# Patient Record
Sex: FEMALE | Race: AMERICAN INDIAN OR ALASKA NATIVE | Employment: UNEMPLOYED | URBAN - METROPOLITAN AREA
[De-identification: names, ages, dates, MRNs, and addresses within clinical notes are randomized per-mention and may not be internally consistent; named-entity substitution may affect disease eponyms.]

---

## 2017-12-18 ENCOUNTER — HISTORICAL (OUTPATIENT)
Dept: LAB | Facility: HOSPITAL | Age: 32
End: 2017-12-18

## 2017-12-18 LAB
ALBUMIN SERPL-MCNC: 3.7 GM/DL (ref 3.4–5)
ALBUMIN/GLOB SERPL: 0.8 RATIO (ref 1.1–2)
ALP SERPL-CCNC: 80 UNIT/L (ref 46–116)
ALT SERPL-CCNC: 17 UNIT/L (ref 12–78)
AST SERPL-CCNC: 12 UNIT/L (ref 15–37)
BILIRUB SERPL-MCNC: 0.4 MG/DL (ref 0.2–1)
BILIRUBIN DIRECT+TOT PNL SERPL-MCNC: 0.11 MG/DL (ref 0–0.2)
BILIRUBIN DIRECT+TOT PNL SERPL-MCNC: 0.24 MG/DL (ref 0–0.8)
BUN SERPL-MCNC: 10 MG/DL (ref 7–18)
CALCIUM SERPL-MCNC: 9.3 MG/DL (ref 8.5–10.1)
CHLORIDE SERPL-SCNC: 106 MMOL/L (ref 98–107)
CO2 SERPL-SCNC: 26.7 MMOL/L (ref 21–32)
CREAT SERPL-MCNC: 1.24 MG/DL (ref 0.6–1.3)
GLOBULIN SER-MCNC: 4.4 GM/DL (ref 2.4–3.5)
GLUCOSE SERPL-MCNC: 90 MG/DL (ref 74–106)
POTASSIUM SERPL-SCNC: 3.8 MMOL/L (ref 3.5–5.1)
PROT SERPL-MCNC: 8.1 GM/DL (ref 6.4–8.2)
SODIUM SERPL-SCNC: 142 MMOL/L (ref 136–145)

## 2018-07-23 ENCOUNTER — HISTORICAL (OUTPATIENT)
Dept: RADIOLOGY | Facility: HOSPITAL | Age: 33
End: 2018-07-23

## 2019-03-20 ENCOUNTER — HISTORICAL (OUTPATIENT)
Dept: LAB | Facility: HOSPITAL | Age: 34
End: 2019-03-20

## 2019-03-23 LAB
BUN SERPL-MCNC: 12 MG/DL (ref 7–18)
CALCIUM SERPL-MCNC: 9.3 MG/DL (ref 8.5–10.1)
CHLORIDE SERPL-SCNC: 106 MMOL/L (ref 98–107)
CO2 SERPL-SCNC: 31.9 MMOL/L (ref 21–32)
CREAT SERPL-MCNC: 1.31 MG/DL (ref 0.6–1.3)
CREAT UR-MCNC: 59.6 MG/DL
CREAT/UREA NIT SERPL: 9
ERYTHROCYTE [DISTWIDTH] IN BLOOD BY AUTOMATED COUNT: 14.7 % (ref 11.5–17)
GLUCOSE SERPL-MCNC: 95 MG/DL (ref 74–106)
HCT VFR BLD AUTO: 37.1 % (ref 37–47)
HGB BLD-MCNC: 12.1 GM/DL (ref 12–16)
MCH RBC QN AUTO: 29.6 PG (ref 27–31)
MCHC RBC AUTO-ENTMCNC: 32.6 GM/DL (ref 33–36)
MCV RBC AUTO: 90.7 FL (ref 80–94)
PLATELET # BLD AUTO: 301 X10(3)/MCL (ref 130–400)
PMV BLD AUTO: 9.7 FL (ref 9.4–12.4)
POTASSIUM SERPL-SCNC: 3.6 MMOL/L (ref 3.5–5.1)
PROT 24H UR-MCNC: 903 MG/24HR (ref 0–165)
RBC # BLD AUTO: 4.09 X10(6)/MCL (ref 4.2–5.4)
SODIUM SERPL-SCNC: 143 MMOL/L (ref 136–145)
WBC # SPEC AUTO: 4.4 X10(3)/MCL (ref 4.5–11.5)

## 2019-05-17 PROBLEM — D64.9 ANEMIA: Status: ACTIVE | Noted: 2019-05-17

## 2019-05-17 PROBLEM — E03.9 HYPOTHYROIDISM: Status: ACTIVE | Noted: 2019-05-17

## 2019-05-17 PROBLEM — S06.5XAA SUBDURAL HEMATOMA: Status: ACTIVE | Noted: 2019-05-17

## 2019-05-17 PROBLEM — S06.5XAA SDH (SUBDURAL HEMATOMA): Status: ACTIVE | Noted: 2019-05-17

## 2019-05-18 ENCOUNTER — HOSPITAL ENCOUNTER (INPATIENT)
Facility: HOSPITAL | Age: 34
LOS: 3 days | Discharge: HOME | DRG: 021 | End: 2019-05-21
Attending: PSYCHIATRY & NEUROLOGY | Admitting: PSYCHIATRY & NEUROLOGY
Payer: MEDICAID

## 2019-05-18 DIAGNOSIS — Z29.89 SEIZURE PROPHYLAXIS: ICD-10-CM

## 2019-05-18 DIAGNOSIS — G08 THROMBOSIS OF SUPERIOR SAGITTAL SINUS: Primary | ICD-10-CM

## 2019-05-18 DIAGNOSIS — S06.5XAA SUBDURAL HEMATOMA: ICD-10-CM

## 2019-05-18 DIAGNOSIS — S06.5XAA SDH (SUBDURAL HEMATOMA): ICD-10-CM

## 2019-05-18 PROCEDURE — 25000003 PHARM REV CODE 250: Performed by: UROLOGY

## 2019-05-18 PROCEDURE — 20000000 HC ICU ROOM

## 2019-05-18 RX ORDER — SODIUM CHLORIDE 9 MG/ML
INJECTION, SOLUTION INTRAVENOUS CONTINUOUS
Status: DISCONTINUED | OUTPATIENT
Start: 2019-05-18 | End: 2019-05-19

## 2019-05-18 RX ORDER — SODIUM CHLORIDE 0.9 % (FLUSH) 0.9 %
10 SYRINGE (ML) INJECTION
Status: DISCONTINUED | OUTPATIENT
Start: 2019-05-18 | End: 2019-05-21 | Stop reason: HOSPADM

## 2019-05-18 RX ADMIN — SODIUM CHLORIDE: 0.9 INJECTION, SOLUTION INTRAVENOUS at 11:05

## 2019-05-19 PROBLEM — G08 CEREBRAL VENOUS THROMBOSIS OF CORTICAL VEIN: Status: ACTIVE | Noted: 2019-05-19

## 2019-05-19 PROBLEM — G08 THROMBOSIS OF SUPERIOR SAGITTAL SINUS: Status: ACTIVE | Noted: 2019-05-19

## 2019-05-19 LAB
ABO + RH BLD: NORMAL
ASCENDING AORTA: 3.35 CM
BASOPHILS # BLD AUTO: 0.06 K/UL (ref 0–0.2)
BASOPHILS NFR BLD: 1 % (ref 0–1.9)
BLD GP AB SCN CELLS X3 SERPL QL: NORMAL
BSA FOR ECHO PROCEDURE: 1.74 M2
CHOLEST SERPL-MCNC: 154 MG/DL (ref 120–199)
CHOLEST/HDLC SERPL: 3.8 {RATIO} (ref 2–5)
CV ECHO LV RWT: 0.26 CM
DIFFERENTIAL METHOD: ABNORMAL
DOP CALC LVOT AREA: 3.14 CM2
DOP CALC LVOT DIAMETER: 2 CM
DOP CALC LVOT PEAK VEL: 0.85 M/S
DOP CALC LVOT STROKE VOLUME: 57.71 CM3
DOP CALCLVOT PEAK VEL VTI: 18.38 CM
E WAVE DECELERATION TIME: 111.35 MSEC
E/A RATIO: 1.33
E/E' RATIO: 8
ECHO LV POSTERIOR WALL: 0.62 CM (ref 0.6–1.1)
EOSINOPHIL # BLD AUTO: 0.6 K/UL (ref 0–0.5)
EOSINOPHIL NFR BLD: 8.9 % (ref 0–8)
ERYTHROCYTE [DISTWIDTH] IN BLOOD BY AUTOMATED COUNT: 14 % (ref 11.5–14.5)
ESTIMATED AVG GLUCOSE: 111 MG/DL (ref 68–131)
FRACTIONAL SHORTENING: 40 % (ref 28–44)
HBA1C MFR BLD HPLC: 5.5 % (ref 4–5.6)
HCT VFR BLD AUTO: 36.7 % (ref 37–48.5)
HDLC SERPL-MCNC: 41 MG/DL (ref 40–75)
HDLC SERPL: 26.6 % (ref 20–50)
HGB BLD-MCNC: 11.9 G/DL (ref 12–16)
IMM GRANULOCYTES # BLD AUTO: 0.01 K/UL (ref 0–0.04)
IMM GRANULOCYTES NFR BLD AUTO: 0.2 % (ref 0–0.5)
INTERVENTRICULAR SEPTUM: 0.61 CM (ref 0.6–1.1)
IVRT: 0.11 MSEC
LA MAJOR: 5.28 CM
LA MINOR: 4.89 CM
LA WIDTH: 3.65 CM
LDLC SERPL CALC-MCNC: 93 MG/DL (ref 63–159)
LEFT ATRIUM SIZE: 3.31 CM
LEFT ATRIUM VOLUME INDEX: 30.4 ML/M2
LEFT ATRIUM VOLUME: 52.14 CM3
LEFT INTERNAL DIMENSION IN SYSTOLE: 2.92 CM (ref 2.1–4)
LEFT VENTRICLE DIASTOLIC VOLUME INDEX: 64.32 ML/M2
LEFT VENTRICLE DIASTOLIC VOLUME: 110.38 ML
LEFT VENTRICLE MASS INDEX: 54 G/M2
LEFT VENTRICLE SYSTOLIC VOLUME INDEX: 19.2 ML/M2
LEFT VENTRICLE SYSTOLIC VOLUME: 32.88 ML
LEFT VENTRICULAR INTERNAL DIMENSION IN DIASTOLE: 4.85 CM (ref 3.5–6)
LEFT VENTRICULAR MASS: 92.68 G
LV LATERAL E/E' RATIO: 7.2
LV SEPTAL E/E' RATIO: 9
LYMPHOCYTES # BLD AUTO: 1.9 K/UL (ref 1–4.8)
LYMPHOCYTES NFR BLD: 29.9 % (ref 18–48)
MAGNESIUM SERPL-MCNC: 1.7 MG/DL (ref 1.6–2.6)
MCH RBC QN AUTO: 29.5 PG (ref 27–31)
MCHC RBC AUTO-ENTMCNC: 32.4 G/DL (ref 32–36)
MCV RBC AUTO: 91 FL (ref 82–98)
MONOCYTES # BLD AUTO: 0.5 K/UL (ref 0.3–1)
MONOCYTES NFR BLD: 8.1 % (ref 4–15)
MV PEAK A VEL: 0.54 M/S
MV PEAK E VEL: 0.72 M/S
NEUTROPHILS # BLD AUTO: 3.2 K/UL (ref 1.8–7.7)
NEUTROPHILS NFR BLD: 51.9 % (ref 38–73)
NONHDLC SERPL-MCNC: 113 MG/DL
NRBC BLD-RTO: 0 /100 WBC
PHOSPHATE SERPL-MCNC: 3.5 MG/DL (ref 2.7–4.5)
PLATELET # BLD AUTO: 319 K/UL (ref 150–350)
PMV BLD AUTO: 9.7 FL (ref 9.2–12.9)
PULM VEIN S/D RATIO: 1.5
PV PEAK D VEL: 0.34 M/S
PV PEAK S VEL: 0.51 M/S
RA MAJOR: 5.23 CM
RA WIDTH: 2.31 CM
RBC # BLD AUTO: 4.03 M/UL (ref 4–5.4)
RIGHT VENTRICULAR END-DIASTOLIC DIMENSION: 2.73 CM
RV TISSUE DOPPLER FREE WALL SYSTOLIC VELOCITY 1 (APICAL 4 CHAMBER VIEW): 12.12 M/S
SINUS: 3.17 CM
STJ: 3.02 CM
TDI LATERAL: 0.1
TDI SEPTAL: 0.08
TDI: 0.09
TRICUSPID ANNULAR PLANE SYSTOLIC EXCURSION: 2.43 CM
TRIGL SERPL-MCNC: 100 MG/DL (ref 30–150)
TSH SERPL DL<=0.005 MIU/L-ACNC: 0.76 UIU/ML (ref 0.4–4)
WBC # BLD AUTO: 6.18 K/UL (ref 3.9–12.7)

## 2019-05-19 PROCEDURE — 20600001 HC STEP DOWN PRIVATE ROOM

## 2019-05-19 PROCEDURE — 86901 BLOOD TYPING SEROLOGIC RH(D): CPT

## 2019-05-19 PROCEDURE — 99232 SBSQ HOSP IP/OBS MODERATE 35: CPT | Mod: ,,, | Performed by: NEUROLOGICAL SURGERY

## 2019-05-19 PROCEDURE — 99232 PR SUBSEQUENT HOSPITAL CARE,LEVL II: ICD-10-PCS | Mod: ,,, | Performed by: NEUROLOGICAL SURGERY

## 2019-05-19 PROCEDURE — 83036 HEMOGLOBIN GLYCOSYLATED A1C: CPT

## 2019-05-19 PROCEDURE — 85025 COMPLETE CBC W/AUTO DIFF WBC: CPT

## 2019-05-19 PROCEDURE — 99232 SBSQ HOSP IP/OBS MODERATE 35: CPT | Mod: ,,, | Performed by: PSYCHIATRY & NEUROLOGY

## 2019-05-19 PROCEDURE — 99232 PR SUBSEQUENT HOSPITAL CARE,LEVL II: ICD-10-PCS | Mod: ,,, | Performed by: PSYCHIATRY & NEUROLOGY

## 2019-05-19 PROCEDURE — 80061 LIPID PANEL: CPT

## 2019-05-19 PROCEDURE — 84443 ASSAY THYROID STIM HORMONE: CPT

## 2019-05-19 PROCEDURE — 84100 ASSAY OF PHOSPHORUS: CPT

## 2019-05-19 PROCEDURE — 83735 ASSAY OF MAGNESIUM: CPT

## 2019-05-19 RX ORDER — LEVOTHYROXINE SODIUM 100 UG/1
100 TABLET ORAL
Status: DISCONTINUED | OUTPATIENT
Start: 2019-05-19 | End: 2019-05-21 | Stop reason: HOSPADM

## 2019-05-19 RX ORDER — FOLIC ACID 1 MG/1
1 TABLET ORAL DAILY
Status: DISCONTINUED | OUTPATIENT
Start: 2019-05-19 | End: 2019-05-21 | Stop reason: HOSPADM

## 2019-05-19 NOTE — ASSESSMENT & PLAN NOTE
33 y/o female with Sinus thrombosis    Diagnostics: cerebral angiogram  Antithrombotics: None due to SDH  Statin: Not needed   Risk factors modification: none  BP< SBP <140  VTE: SCD's  Therapy: PT/OT/ST

## 2019-05-19 NOTE — NURSING TRANSFER
Nursing Transfer Note      5/19/2019     Transfer To: 730A    Transfer via wheelchair    Transfer with cardiac monitoring    Transported by RN    Medicines sent: n/a    Chart send with patient: Yes    Prema, Receiving RN at [facility]    Upon arrival to floor: patient oriented to room, call bell in reach and bed in lowest position

## 2019-05-19 NOTE — PLAN OF CARE
Problem: Adult Inpatient Plan of Care  Goal: Plan of Care Review      Recommendations    Recommendation/Intervention:     1. Continue cardiac diet as tolerated.    Pt with good appetite and po intake at this time.     2. If po intake <50%, recommend adding Boost Plus TID.   3. RD following.     Goals: meet >85% EEN/EPN  Nutrition Goal Status: new

## 2019-05-19 NOTE — H&P
"[facility]  Neurocritical Care  History & Physical    Admit Date: 5/18/2019  Service Date: 05/19/2019  Length of Stay: 1    Subjective:     Chief Complaint: <principal problem not specified>    History of Present Illness: 33 y/o F with hx of Iron Deficiency Anemia and CKD who presented to OSH yesterday with complaint of headache, right sided body "tingling", bilateral hand stiffening.     Similar episode in 12/2018 for which work-up revealed acute on chronic SDH and Superior Sagittal Sinus Thrombosis.   No intervention / anticoagulation on prior evaluation.   CT and MRI today consistent with prior imaging studies.     Denies head trauma, anticoagulation / oral contraception.  Neurologic complaints have resolved.    Past Medical History:   Diagnosis Date    Hypothyroid     Subdural hematoma 5/17/2019     No past surgical history on file.   Current Facility-Administered Medications on File Prior to Encounter   Medication Dose Route Frequency Provider Last Rate Last Dose    [DISCONTINUED] 0.9%  NaCl infusion   Intravenous Continuous Nino Bowman MD 75 mL/hr at 05/18/19 0737      [DISCONTINUED] chlorhexidine 0.12 % solution 10 mL  10 mL Mouth/Throat BID Merline Hanley MD   10 mL at 05/18/19 0853    [DISCONTINUED] levETIRAcetam in NaCl (iso-os) IVPB 500 mg  500 mg Intravenous Q12H Merline Hanley  mL/hr at 05/18/19 0853 500 mg at 05/18/19 0853    [DISCONTINUED] morphine injection 2 mg  2 mg Intravenous Q4H PRN Merline Hanley MD        [DISCONTINUED] mupirocin 2 % ointment   Nasal BID Merline Hanley MD        [DISCONTINUED] ondansetron injection 4 mg  4 mg Intravenous Q8H PRN Merline Hanley MD        [DISCONTINUED] sodium chloride 0.9% flush 10 mL  10 mL Intravenous PRN Merline Hanley MD         Current Outpatient Medications on File Prior to Encounter   Medication Sig Dispense Refill    cholecalciferol, vitamin D3, (VITAMIN D3) 400 " unit Chew Take by mouth.      folic acid (FOLVITE) 1 MG tablet Take 1 mg by mouth once daily.      levothyroxine (SYNTHROID) 100 MCG tablet Take 100 mcg by mouth once daily.        Allergies: Patient has no known allergies.    No family history on file.  Social History     Tobacco Use    Smoking status: Never Smoker    Smokeless tobacco: Never Used   Substance Use Topics    Alcohol use: Not on file    Drug use: Not on file     Review of Systems   Constitutional: Negative.    HENT: Negative.    Eyes: Negative.    Respiratory: Negative.    Cardiovascular: Negative.    Gastrointestinal: Negative.    Endocrine: Negative.    Genitourinary: Negative.    Musculoskeletal: Negative.    Skin: Negative.    Allergic/Immunologic: Negative.    Neurological: Positive for weakness, light-headedness and numbness.   Hematological: Negative.    Psychiatric/Behavioral: Negative.      Objective:     Vitals:    Temp: 98.5 °F (36.9 °C)  Pulse: 81  Rhythm: normal sinus rhythm  BP: 101/73  MAP (mmHg): 84  Resp: 18  SpO2: 98 %    Temp  Min: 97.7 °F (36.5 °C)  Max: 98.5 °F (36.9 °C)  Pulse  Min: 56  Max: 90  BP  Min: 95/59  Max: 120/78  MAP (mmHg)  Min: 68  Max: 94  Resp  Min: 12  Max: 20  SpO2  Min: 98 %  Max: 99 %    No intake/output data recorded.   Unmeasured Output  Urine Occurrence: 1       Physical Exam   Constitutional: She is oriented to person, place, and time. She appears well-developed and well-nourished.   HENT:   Head: Normocephalic and atraumatic.   Eyes: Pupils are equal, round, and reactive to light. EOM are normal.   Neck: Normal range of motion.   Cardiovascular: Normal rate and regular rhythm.   Pulmonary/Chest: Effort normal and breath sounds normal.   Abdominal: Soft. She exhibits no distension. There is no tenderness.   Musculoskeletal: Normal range of motion.   Neurological: She is alert and oriented to person, place, and time. She has normal strength and normal reflexes. She displays normal reflexes. No cranial  nerve deficit or sensory deficit. She exhibits normal muscle tone. She displays a negative Romberg sign. GCS eye subscore is 4. GCS verbal subscore is 5. GCS motor subscore is 6.   Skin: Skin is warm and dry.   Psychiatric: She has a normal mood and affect. Her behavior is normal. Judgment and thought content normal.   Nursing note and vitals reviewed.      Today I personally reviewed pertinent medications, lines/drains/airways, imaging, cardiology results, laboratory results, notably:        Assessment/Plan:     Neuro  Thrombosis of superior sagittal sinus  Hold Anticoagulation  Vascular Neurology and IR consulted for evaluation for Angio     Subdural hematoma  Stable on Imaging  No current neurological symptoms  SBP <160  Neuro Exam q2 hours  NPO for now  IV PRN Medications    Vascular Neurology Consult  Intervention Radiology Consult          The patient is being Prophylaxed for:  Venous Thromboembolism with: Mechanical  Stress Ulcer with: None  Ventilator Pneumonia with: not applicable    Activity Orders          Diet NPO: NPO starting at 05/18 2248    Commode at bedside starting at 05/18 2248        Full Code    Mitch Beyer MD  Neurocritical Care  [facility]

## 2019-05-19 NOTE — PLAN OF CARE
Patient seen on rounds with staff, Lobo. Plans for stepdown to NSU under Vascular Neurology service. Pt NPO at midnight for cerebral angiogram 5/20 AM.    For further details of history and plan, see Vascular Neurology consult note from earlier this date.        Usha Kyle PA-C  [facility] Stroke [place] - [id number]  Department of Vascular Neurology   [facility]

## 2019-05-19 NOTE — CONSULTS
[facility]  Neurosurgery  Consult Note    Consults  Subjective:     Chief Complaint/Reason for Admission: SSS thrombosis w/ acute R SDH    History of Present Illness: 34F w/ PMH hypothyroidism, CKD and SSS thrombosis (dx'd in 12/18') who presents w/ HA & paresthesias now w/ acute R SDH in the setting of persistent venous sinus thrombosis. Patient was diagnosed with SSS thrombosis last December after she had multiple months of HA and R hemibody paresthesias. She was treated by her neurologist without intervention (last visit 04/19'). Two days ago patient was driving and experienced an episode of paresthesias and has had worsening HAs over the past couple days that prompted re-presentation for reimaging. CTH demonstrated new acute R SDH. MRA demonstrated persistent SSS venous sinus thrombosis. She denies any recent head trauma. Pt is a nonsmoker. She is not on anticoagulant/antiplatelet or hormonal/ oral contraceptive medications.      Medications:  Continuous Infusions:  Scheduled Meds:   folic acid  1 mg Oral Daily    levothyroxine  100 mcg Oral Before breakfast     PRN Meds:sodium chloride 0.9%     Review of Systems   Constitutional: Negative for activity change, chills, fatigue, fever and unexpected weight change.   HENT: Negative for tinnitus and voice change.    Eyes: Negative for photophobia and visual disturbance.   Respiratory: Negative for cough, choking, chest tightness, shortness of breath, wheezing and stridor.    Cardiovascular: Negative for chest pain and palpitations.   Gastrointestinal: Negative for abdominal distention, abdominal pain, constipation, diarrhea, nausea and vomiting.   Endocrine: Negative for polydipsia, polyphagia and polyuria.   Genitourinary: Negative for difficulty urinating, dysuria, frequency, hematuria and urgency.   Musculoskeletal: Negative for back pain, gait problem and neck stiffness.   Skin: Negative for pallor, rash and wound.   Neurological: Positive  for numbness and headaches. Negative for dizziness, tremors, seizures, syncope, facial asymmetry, speech difficulty, weakness and light-headedness.   Psychiatric/Behavioral: Negative for agitation, behavioral problems and confusion.     Objective:     Weight: 66.7 kg (147 lb)  Body mass index is 25.23 kg/m².  Vital Signs (Most Recent):  Temp: 98.4 °F (36.9 °C) (05/19/19 1504)  Pulse: 72 (05/19/19 1504)  Resp: 14 (05/19/19 1504)  BP: 96/66 (05/19/19 1504)  SpO2: 95 % (05/19/19 1504) Vital Signs (24h Range):  Temp:  [98 °F (36.7 °C)-98.5 °F (36.9 °C)] 98.4 °F (36.9 °C)  Pulse:  [] 72  Resp:  [12-36] 14  SpO2:  [94 %-100 %] 95 %  BP: ()/(54-87) 96/66     Date 05/19/19 0700 - 05/20/19 0659   Shift 5472-9334 9589-0134 1151-8407 24 Hour Total   INTAKE   P.O. 250   250   I.V.(mL/kg) 231.3(3.5)   231.3(3.5)   Shift Total(mL/kg) 481.3(7.2)   481.3(7.2)   OUTPUT   Shift Total(mL/kg)       Weight (kg) 66.7 66.7 66.7 66.7               Neurosurgery Physical Exam  General: AOx3, GCS [id number]  CNII-XII: Intact on fine exam, PERRL, EOMi, facial sensation preserved, no facial assymetry, tongue/uvula/palate midline, shoulder shrug equal, no pronator drift  Extremities: 5/5 motor throughout, sensorium intact throughout, coordination intact throughout, DTRs 2+, no pathological reflexes, no sensory level present    Significant Labs:  Recent Labs   Lab 05/19/19  0314   MG 1.7     Recent Labs   Lab 05/19/19  0314   WBC 6.18   HGB 11.9*   HCT 36.7*        No results for input(s): LABPT, INR, APTT in the last 48 hours.  Microbiology Results (last 7 days)     ** No results found for the last 168 hours. **        ABGs: No results for input(s): PH, PCO2, PO2, HCO3, POCSATURATED, BE in the last 48 hours.  Cardiac markers: No results for input(s): CKMB, CPKMB, TROPONINT, TROPONINI, MYOGLOBIN in the last 48 hours.  CMP: No results for input(s): GLU, CALCIUM, ALBUMIN, PROT, NA, K, CO2, CL, BUN, CREATININE, ALKPHOS, ALT, AST,  BILITOT in the last 48 hours.  CRP: No results for input(s): CRP in the last 48 hours.  ESR: No results for input(s): POCESR, ERYTHROCYTES in the last 48 hours.  LFTs: No results for input(s): ALT, AST, ALKPHOS, BILITOT, PROT, ALBUMIN in the last 48 hours.  Procalcitonin: No results for input(s): PROCAL in the last 48 hours.    Significant Diagnostics:  I have reviewed all pertinent imaging results/findings within the past 24 hours.    Assessment/Plan:     Thrombosis of superior sagittal sinus  34F w/ PMH hypothyroidism, CKD and SSS thrombosis (dx'd in 12/18') who presents w/ HA & paresthesias now w/ acute R SDH in the setting of persistent venous sinus thrombosis.    --Admitted to Neuro-ICU, NSGY following  --All labs and diagnostics reviewed  --Follow-up CTH with stable acute R SDH  --Recc SBP <160  --Recc Keppra 500 BID  --MRA w/ SSS thrombosis  --Continue care per primary team  --Patient booked and consented for IR angio in AM  --NPO @ [place]        Thank you for your consult. I will follow-up with patient. Please contact us if you have any additional questions.    Bolivar Delgadillo MD  Neurosurgery  [facility]

## 2019-05-19 NOTE — PROCEDURE NOTE ADDENDUM
Pt transported to and from CT by RN via wheelchair on cardiac monitor. No distress noted, VSS. Pt reconnected to monitor and settled back in room. Will continue to monitor.

## 2019-05-19 NOTE — PLAN OF CARE
POC reviewed with pt at 1600. Pt verbalized understanding. Questions and concerns addressed. No acute events today. Ct completed. NPO at 0000 for angio tomorrow, chlorhexidine bath given and consents signed. Neuro intact, denies HA or tingling. Echo completed, NS stopped. Awaiting transfer to stepdown. Pt progressing toward goals. Will continue to monitor. See flowsheets for full assessment and VS info.

## 2019-05-19 NOTE — ASSESSMENT & PLAN NOTE
Stable on Imaging  No current neurological symptoms  SBP <160  Neuro Exam q2 hours  NPO for now  IV PRN Medications    Vascular Neurology Consult  Intervention Radiology Consult

## 2019-05-19 NOTE — NURSING
Patient arrived to [facility] from [facility] via [facility] to [facility]  Type of stroke/diagnosis:  SDH, Sinus thrombosis    Current symptoms: initially slurred speech, now resolved    Skin assessment done: Yes  Wounds noted: none    NCC notified: SUNG Gomez    Will continue to monitor and treat per POC

## 2019-05-19 NOTE — CONSULTS
[facility]  Vascular Neurology  Comprehensive Stroke Center  Consult Note    Inpatient consult to Vascular (Stroke) Neurology  Consult performed by: Ruby Lucio NP  Consult ordered by: Mitch Beyer MD        Assessment/Plan:     Patient is a 34 y.o. year old female with:    Thrombosis of superior sagittal sinus  35 y/o female with Sinus thrombosis    Diagnostics: cerebral angiogram  Antithrombotics: None due to SDH  Statin: Not needed   Risk factors modification: none  BP< SBP <140  VTE: SCD's  Therapy: PT/OT/ST      Subdural hematoma  Per NCCU and NSGY        STROKE DOCUMENTATION          NIH Scale:  1a. Level of Consciousness: 0-->Alert, keenly responsive  1b. LOC Questions: 0-->Answers both questions correctly  1c. LOC Commands: 0-->Performs both tasks correctly  2. Best Gaze: 0-->Normal  3. Visual: 0-->No visual loss  4. Facial Palsy: 0-->Normal symmetrical movements  5a. Motor Arm, Left: 0-->No drift, limb holds 90 (or 45) degrees for full 10 secs  5b. Motor Arm, Right: 0-->No drift, limb holds 90 (or 45) degrees for full 10 secs  6a. Motor Leg, Left: 0-->No drift, leg holds 30 degree position for full 5 secs  6b. Motor Leg, Right: 0-->No drift, leg holds 30 degree position for full 5 secs  7. Limb Ataxia: 0-->Absent  8. Sensory: 0-->Normal, no sensory loss  9. Best Language: 0-->No aphasia, normal  10. Dysarthria: 0-->Normal  11. Extinction and Inattention (formerly Neglect): 0-->No abnormality  Total (NIH Stroke Scale): 0    Modified Anali Score: 0  [place] Coma Scale:15   ABCD2 Score:    [id number] Score:   HAS -BLED Score:   ICH Score:   Hunt & Francois Classification:       Thrombolysis Candidate? No, Strong suspicion for stroke mimic or alternative diagnosis       Interventional Revascularization Candidate?   Is the patient eligible for mechanical endovascular reperfusion (TISH)?  NO thrombosis sinus      Hemorrhagic change of an Ischemic Stroke: Does this patient  "have an ischemic stroke with hemorrhagic changes? No     Subjective:     History of Present Illness:  34 year old right handed female with history of hypothyroidism, Stage 2 CKD and SDH presented to [facility] 5-17-19  with c/o headache, generalized body tingling, and bilateral "hand-stiffening." She was seen 12/2018 in [place] near her home where a MRI and CT was performed and patient was diagnosed with chronic SDH and venous thrombosis. A  head CT was repeated upon arrival to [facility] which showed acute right convexity SDH. Pt also experienced slurred speech, blurred vision, left sided numbness during symptomatic episodes.  Pt states that she has suffered with episodic headaches and fatigue since 2015 that she treats with tylenol. She has since been followed closely by her Neurologist (last visit 4/2019) with no intervention. She denies any recent head trauma.Patient was transferred to [facility] on 5-18-19 for further evaluation and cerebral angiogram.   Risk factors hypothyroid        Past Medical History:   Diagnosis Date    Hypothyroid     Subdural hematoma 5/17/2019     No past surgical history on file.  No family history on file.  Social History     Tobacco Use    Smoking status: Never Smoker    Smokeless tobacco: Never Used   Substance Use Topics    Alcohol use: Not on file    Drug use: Not on file     Review of patient's allergies indicates:  No Known Allergies    Medications: I have reviewed the current medication administration record.    Medications Prior to Admission   Medication Sig Dispense Refill Last Dose    cholecalciferol, vitamin D3, (VITAMIN D3) 400 unit Chew Take by mouth.       folic acid (FOLVITE) 1 MG tablet Take 1 mg by mouth once daily.       levothyroxine (SYNTHROID) 100 MCG tablet Take 100 mcg by mouth once daily.          Review of Systems   Constitutional: Negative for chills and fever.   HENT: Negative for ear discharge and ear pain.    Eyes: Negative for pain and itching. "   Respiratory: Negative for cough and shortness of breath.    Cardiovascular: Negative for chest pain and leg swelling.   Gastrointestinal: Negative for abdominal distention and abdominal pain.   Genitourinary: Negative for difficulty urinating and dysuria.   Musculoskeletal: Negative for arthralgias and back pain.   Skin: Negative for rash and wound.   Neurological: Positive for headaches.     Objective:     Vital Signs (Most Recent):  Temp: 98.5 °F (36.9 °C) (05/18/19 2200)  Pulse: 81 (05/18/19 2300)  Resp: 18 (05/18/19 2300)  BP: 101/73 (05/18/19 2300)  SpO2: 98 % (05/18/19 2300)    Vital Signs Range (Last 24H):  Temp:  [97.7 °F (36.5 °C)-98.5 °F (36.9 °C)]   Pulse:  [56-90]   Resp:  [12-20]   BP: ()/(59-83)   SpO2:  [98 %-99 %]     Physical Exam   Constitutional: She is oriented to person, place, and time. She appears well-developed and well-nourished.   HENT:   Head: Normocephalic and atraumatic.   Eyes: Pupils are equal, round, and reactive to light. EOM are normal.   Neck: Normal range of motion.   Cardiovascular: Normal rate.   Pulmonary/Chest: Effort normal.   Abdominal: Soft.   Neurological: She is alert and oriented to person, place, and time.   Skin: Skin is warm and dry.   Nursing note and vitals reviewed.      Neurological Exam:   LOC: alert  Attention Span: Good   Language: No aphasia  Articulation: No dysarthria  Orientation: Person, Place, Time   Visual Fields: Full  EOM (CN III, IV, VI): Full/intact  Pupils (CN II, III): PERRL  Facial Sensation (CN V): Normal  Facial Movement (CN VII): Symmetric facial expression    Gag Reflex: present  Reflexes: flexor plantar responses bilaterally  Motor: Arm left  Normal 5/5  Leg left  Normal 5/5  Arm right  Normal 5/5  Leg right Normal 5/5  Cebellar: No evidence of appendicular or axial ataxia  Sensation: Intact to light touch, temperature and vibration  Tone: Normal tone throughout      Laboratory:  CMP: No results for input(s): GLUCOSE, CALCIUM,  ALBUMIN, PROT, NA, K, CO2, CL, BUN, CREATININE, ALKPHOS, ALT, AST, BILITOT in the last 24 hours.  CBC:   Recent Labs   Lab 05/17/19  0438   WBC 5.24   RBC 3.70*   HGB 11.1*   HCT 33.7*      MCV 91   MCH 30.0   MCHC 32.9     Lipid Panel: No results for input(s): CHOL, LDLCALC, HDL, TRIG in the last 168 hours.  Coagulation:   Recent Labs   Lab 05/17/19  0438   INR 1.1     Hgb A1C: No results for input(s): HGBA1C in the last 168 hours.  TSH: No results for input(s): TSH in the last 168 hours.    Diagnostic Results:      Brain imaging:  MRI Brain w and w/o contrast 5-17-19 results:  1. Expansion of the superior sagittal sinus with apparent remodeling of the overlying calvarium.  Thin string preserved superior sagittal sinus flow with prominent collateral drainage via the veins of Ian bilaterally.  Nonenhancing material within the superior sagittal sinus is T1 isointense prominently hypointense on T2, FLAIR and susceptibility and does not restrict diffusion.  This is favored represent chronic thrombus, however there was hyperattenuation within the sinus on CT from 05/16/2019 as well as CT from 12/21/2018 and acute blood could conceivably also have this appearance.  2. Unchanged thin right convexity subdural collection compared to prior study with pachymeningeal enhancement extending along the superior sagittal sinus and right convexity adjacent to the collection.  3. 6 x 4 x 8 mm sellar lesion.  This could represent a pituitary adenoma.    Vessel Imaging:      Cardiac Evaluation:         Ruby Lucio NP  [facility]  Department of Vascular Neurology   [facility]

## 2019-05-19 NOTE — SUBJECTIVE & OBJECTIVE
Past Medical History:   Diagnosis Date    Hypothyroid     Subdural hematoma 5/17/2019     No past surgical history on file.  No family history on file.  Social History     Tobacco Use    Smoking status: Never Smoker    Smokeless tobacco: Never Used   Substance Use Topics    Alcohol use: Not on file    Drug use: Not on file     Review of patient's allergies indicates:  No Known Allergies    Medications: I have reviewed the current medication administration record.    Medications Prior to Admission   Medication Sig Dispense Refill Last Dose    cholecalciferol, vitamin D3, (VITAMIN D3) 400 unit Chew Take by mouth.       folic acid (FOLVITE) 1 MG tablet Take 1 mg by mouth once daily.       levothyroxine (SYNTHROID) 100 MCG tablet Take 100 mcg by mouth once daily.          Review of Systems   Constitutional: Negative for chills and fever.   HENT: Negative for ear discharge and ear pain.    Eyes: Negative for pain and itching.   Respiratory: Negative for cough and shortness of breath.    Cardiovascular: Negative for chest pain and leg swelling.   Gastrointestinal: Negative for abdominal distention and abdominal pain.   Genitourinary: Negative for difficulty urinating and dysuria.   Musculoskeletal: Negative for arthralgias and back pain.   Skin: Negative for rash and wound.   Neurological: Positive for headaches.     Objective:     Vital Signs (Most Recent):  Temp: 98.5 °F (36.9 °C) (05/18/19 2200)  Pulse: 81 (05/18/19 2300)  Resp: 18 (05/18/19 2300)  BP: 101/73 (05/18/19 2300)  SpO2: 98 % (05/18/19 2300)    Vital Signs Range (Last 24H):  Temp:  [97.7 °F (36.5 °C)-98.5 °F (36.9 °C)]   Pulse:  [56-90]   Resp:  [12-20]   BP: ()/(59-83)   SpO2:  [98 %-99 %]     Physical Exam   Constitutional: She is oriented to person, place, and time. She appears well-developed and well-nourished.   HENT:   Head: Normocephalic and atraumatic.   Eyes: Pupils are equal, round, and reactive to light. EOM are normal.   Neck:  Normal range of motion.   Cardiovascular: Normal rate.   Pulmonary/Chest: Effort normal.   Abdominal: Soft.   Neurological: She is alert and oriented to person, place, and time.   Skin: Skin is warm and dry.   Nursing note and vitals reviewed.      Neurological Exam:   LOC: alert  Attention Span: Good   Language: No aphasia  Articulation: No dysarthria  Orientation: Person, Place, Time   Visual Fields: Full  EOM (CN III, IV, VI): Full/intact  Pupils (CN II, III): PERRL  Facial Sensation (CN V): Normal  Facial Movement (CN VII): Symmetric facial expression    Gag Reflex: present  Reflexes: flexor plantar responses bilaterally  Motor: Arm left  Normal 5/5  Leg left  Normal 5/5  Arm right  Normal 5/5  Leg right Normal 5/5  Cebellar: No evidence of appendicular or axial ataxia  Sensation: Intact to light touch, temperature and vibration  Tone: Normal tone throughout      Laboratory:  CMP: No results for input(s): GLUCOSE, CALCIUM, ALBUMIN, PROT, NA, K, CO2, CL, BUN, CREATININE, ALKPHOS, ALT, AST, BILITOT in the last 24 hours.  CBC:   Recent Labs   Lab 05/17/19  0438   WBC 5.24   RBC 3.70*   HGB 11.1*   HCT 33.7*      MCV 91   MCH 30.0   MCHC 32.9     Lipid Panel: No results for input(s): CHOL, LDLCALC, HDL, TRIG in the last 168 hours.  Coagulation:   Recent Labs   Lab 05/17/19 0438   INR 1.1     Hgb A1C: No results for input(s): HGBA1C in the last 168 hours.  TSH: No results for input(s): TSH in the last 168 hours.    Diagnostic Results:      Brain imaging:  MRI Brain w and w/o contrast 5-17-19 results:  1. Expansion of the superior sagittal sinus with apparent remodeling of the overlying calvarium.  Thin string preserved superior sagittal sinus flow with prominent collateral drainage via the veins of Ian bilaterally.  Nonenhancing material within the superior sagittal sinus is T1 isointense prominently hypointense on T2, FLAIR and susceptibility and does not restrict diffusion.  This is favored represent  chronic thrombus, however there was hyperattenuation within the sinus on CT from 05/16/2019 as well as CT from 12/21/2018 and acute blood could conceivably also have this appearance.  2. Unchanged thin right convexity subdural collection compared to prior study with pachymeningeal enhancement extending along the superior sagittal sinus and right convexity adjacent to the collection.  3. 6 x 4 x 8 mm sellar lesion.  This could represent a pituitary adenoma.    Vessel Imaging:      Cardiac Evaluation:

## 2019-05-19 NOTE — ASSESSMENT & PLAN NOTE
34F w/ PMH hypothyroidism, CKD and SSS thrombosis (dx'd in 12/18') who presents w/ HA & paresthesias now w/ acute R SDH in the setting of persistent venous sinus thrombosis.    --Admitted to Neuro-ICU, NSGY following  --All labs and diagnostics reviewed  --Follow-up CTH with stable acute R SDH  --Recc SBP <160  --Recc Keppra 500 BID  --MRA w/ SSS thrombosis  --Continue care per primary team  --Patient booked and consented for IR angio in AM  --NPO @ [place]

## 2019-05-19 NOTE — SUBJECTIVE & OBJECTIVE
Medications:  Continuous Infusions:  Scheduled Meds:   folic acid  1 mg Oral Daily    levothyroxine  100 mcg Oral Before breakfast     PRN Meds:sodium chloride 0.9%     Review of Systems   Constitutional: Negative for activity change, chills, fatigue, fever and unexpected weight change.   HENT: Negative for tinnitus and voice change.    Eyes: Negative for photophobia and visual disturbance.   Respiratory: Negative for cough, choking, chest tightness, shortness of breath, wheezing and stridor.    Cardiovascular: Negative for chest pain and palpitations.   Gastrointestinal: Negative for abdominal distention, abdominal pain, constipation, diarrhea, nausea and vomiting.   Endocrine: Negative for polydipsia, polyphagia and polyuria.   Genitourinary: Negative for difficulty urinating, dysuria, frequency, hematuria and urgency.   Musculoskeletal: Negative for back pain, gait problem and neck stiffness.   Skin: Negative for pallor, rash and wound.   Neurological: Positive for numbness and headaches. Negative for dizziness, tremors, seizures, syncope, facial asymmetry, speech difficulty, weakness and light-headedness.   Psychiatric/Behavioral: Negative for agitation, behavioral problems and confusion.     Objective:     Weight: 66.7 kg (147 lb)  Body mass index is 25.23 kg/m².  Vital Signs (Most Recent):  Temp: 98.4 °F (36.9 °C) (05/19/19 1504)  Pulse: 72 (05/19/19 1504)  Resp: 14 (05/19/19 1504)  BP: 96/66 (05/19/19 1504)  SpO2: 95 % (05/19/19 1504) Vital Signs (24h Range):  Temp:  [98 °F (36.7 °C)-98.5 °F (36.9 °C)] 98.4 °F (36.9 °C)  Pulse:  [] 72  Resp:  [12-36] 14  SpO2:  [94 %-100 %] 95 %  BP: ()/(54-87) 96/66     Date 05/19/19 0700 - 05/20/19 0659   Shift 2681-9880 0616-5687 3397-9889 24 Hour Total   INTAKE   P.O. 250   250   I.V.(mL/kg) 231.3(3.5)   231.3(3.5)   Shift Total(mL/kg) 481.3(7.2)   481.3(7.2)   OUTPUT   Shift Total(mL/kg)       Weight (kg) 66.7 66.7 66.7 66.7               Neurosurgery  Physical Exam  General: AOx3, GCS [id number]  CNII-XII: Intact on fine exam, PERRL, EOMi, facial sensation preserved, no facial assymetry, tongue/uvula/palate midline, shoulder shrug equal, no pronator drift  Extremities: 5/5 motor throughout, sensorium intact throughout, coordination intact throughout, DTRs 2+, no pathological reflexes, no sensory level present    Significant Labs:  Recent Labs   Lab 05/19/19  0314   MG 1.7     Recent Labs   Lab 05/19/19  0314   WBC 6.18   HGB 11.9*   HCT 36.7*        No results for input(s): LABPT, INR, APTT in the last 48 hours.  Microbiology Results (last 7 days)     ** No results found for the last 168 hours. **        ABGs: No results for input(s): PH, PCO2, PO2, HCO3, POCSATURATED, BE in the last 48 hours.  Cardiac markers: No results for input(s): CKMB, CPKMB, TROPONINT, TROPONINI, MYOGLOBIN in the last 48 hours.  CMP: No results for input(s): GLU, CALCIUM, ALBUMIN, PROT, NA, K, CO2, CL, BUN, CREATININE, ALKPHOS, ALT, AST, BILITOT in the last 48 hours.  CRP: No results for input(s): CRP in the last 48 hours.  ESR: No results for input(s): POCESR, ERYTHROCYTES in the last 48 hours.  LFTs: No results for input(s): ALT, AST, ALKPHOS, BILITOT, PROT, ALBUMIN in the last 48 hours.  Procalcitonin: No results for input(s): PROCAL in the last 48 hours.    Significant Diagnostics:  I have reviewed all pertinent imaging results/findings within the past 24 hours.

## 2019-05-20 LAB
B-HCG UR QL: NEGATIVE
BACTERIA #/AREA URNS AUTO: NORMAL /HPF
BASOPHILS # BLD AUTO: 0.04 K/UL (ref 0–0.2)
BASOPHILS NFR BLD: 0.7 % (ref 0–1.9)
BILIRUB UR QL STRIP: NEGATIVE
CLARITY UR REFRACT.AUTO: CLEAR
COLOR UR AUTO: ABNORMAL
CTP QC/QA: YES
DIFFERENTIAL METHOD: ABNORMAL
EOSINOPHIL # BLD AUTO: 0.4 K/UL (ref 0–0.5)
EOSINOPHIL NFR BLD: 7.7 % (ref 0–8)
ERYTHROCYTE [DISTWIDTH] IN BLOOD BY AUTOMATED COUNT: 14.1 % (ref 11.5–14.5)
GLUCOSE UR QL STRIP: NEGATIVE
HCT VFR BLD AUTO: 37.4 % (ref 37–48.5)
HGB BLD-MCNC: 11.9 G/DL (ref 12–16)
HGB UR QL STRIP: NEGATIVE
HYALINE CASTS UR QL AUTO: 0 /LPF
IMM GRANULOCYTES # BLD AUTO: 0.02 K/UL (ref 0–0.04)
IMM GRANULOCYTES NFR BLD AUTO: 0.4 % (ref 0–0.5)
KETONES UR QL STRIP: NEGATIVE
LEUKOCYTE ESTERASE UR QL STRIP: NEGATIVE
LYMPHOCYTES # BLD AUTO: 1.4 K/UL (ref 1–4.8)
LYMPHOCYTES NFR BLD: 26.3 % (ref 18–48)
MAGNESIUM SERPL-MCNC: 1.7 MG/DL (ref 1.6–2.6)
MCH RBC QN AUTO: 29.5 PG (ref 27–31)
MCHC RBC AUTO-ENTMCNC: 31.8 G/DL (ref 32–36)
MCV RBC AUTO: 93 FL (ref 82–98)
MICROSCOPIC COMMENT: NORMAL
MONOCYTES # BLD AUTO: 0.5 K/UL (ref 0.3–1)
MONOCYTES NFR BLD: 8.5 % (ref 4–15)
NEUTROPHILS # BLD AUTO: 3.1 K/UL (ref 1.8–7.7)
NEUTROPHILS NFR BLD: 56.4 % (ref 38–73)
NITRITE UR QL STRIP: NEGATIVE
NRBC BLD-RTO: 0 /100 WBC
PH UR STRIP: 5 [PH] (ref 5–8)
PHOSPHATE SERPL-MCNC: 3 MG/DL (ref 2.7–4.5)
PLATELET # BLD AUTO: 327 K/UL (ref 150–350)
PMV BLD AUTO: 10.2 FL (ref 9.2–12.9)
PROT UR QL STRIP: ABNORMAL
RBC # BLD AUTO: 4.04 M/UL (ref 4–5.4)
RBC #/AREA URNS AUTO: 1 /HPF (ref 0–4)
SP GR UR STRIP: 1.01 (ref 1–1.03)
SQUAMOUS #/AREA URNS AUTO: 1 /HPF
URN SPEC COLLECT METH UR: ABNORMAL
WBC # BLD AUTO: 5.43 K/UL (ref 3.9–12.7)
WBC #/AREA URNS AUTO: 0 /HPF (ref 0–5)

## 2019-05-20 PROCEDURE — 84100 ASSAY OF PHOSPHORUS: CPT

## 2019-05-20 PROCEDURE — 81001 URINALYSIS AUTO W/SCOPE: CPT

## 2019-05-20 PROCEDURE — 81025 URINE PREGNANCY TEST: CPT | Performed by: PSYCHIATRY & NEUROLOGY

## 2019-05-20 PROCEDURE — 25000003 PHARM REV CODE 250: Performed by: NURSE PRACTITIONER

## 2019-05-20 PROCEDURE — 25000003 PHARM REV CODE 250: Performed by: PSYCHIATRY & NEUROLOGY

## 2019-05-20 PROCEDURE — 25000003 PHARM REV CODE 250: Performed by: FAMILY MEDICINE

## 2019-05-20 PROCEDURE — 36415 COLL VENOUS BLD VENIPUNCTURE: CPT

## 2019-05-20 PROCEDURE — 20600001 HC STEP DOWN PRIVATE ROOM

## 2019-05-20 PROCEDURE — 63600175 PHARM REV CODE 636 W HCPCS: Performed by: PSYCHIATRY & NEUROLOGY

## 2019-05-20 PROCEDURE — 99233 SBSQ HOSP IP/OBS HIGH 50: CPT | Mod: ,,, | Performed by: PSYCHIATRY & NEUROLOGY

## 2019-05-20 PROCEDURE — 83735 ASSAY OF MAGNESIUM: CPT

## 2019-05-20 PROCEDURE — 85025 COMPLETE CBC W/AUTO DIFF WBC: CPT

## 2019-05-20 PROCEDURE — 99233 PR SUBSEQUENT HOSPITAL CARE,LEVL III: ICD-10-PCS | Mod: ,,, | Performed by: PSYCHIATRY & NEUROLOGY

## 2019-05-20 RX ORDER — ACETAMINOPHEN 500 MG
500 TABLET ORAL ONCE
Status: COMPLETED | OUTPATIENT
Start: 2019-05-20 | End: 2019-05-20

## 2019-05-20 RX ORDER — FENTANYL CITRATE 50 UG/ML
INJECTION, SOLUTION INTRAMUSCULAR; INTRAVENOUS CODE/TRAUMA/SEDATION MEDICATION
Status: COMPLETED | OUTPATIENT
Start: 2019-05-20 | End: 2019-05-20

## 2019-05-20 RX ORDER — ACETAMINOPHEN 325 MG/1
650 TABLET ORAL ONCE
Status: DISCONTINUED | OUTPATIENT
Start: 2019-05-20 | End: 2019-05-20

## 2019-05-20 RX ORDER — ACETAMINOPHEN 325 MG/1
650 TABLET ORAL EVERY 6 HOURS PRN
Status: DISCONTINUED | OUTPATIENT
Start: 2019-05-20 | End: 2019-05-21 | Stop reason: HOSPADM

## 2019-05-20 RX ORDER — LEVETIRACETAM 500 MG/1
500 TABLET ORAL 2 TIMES DAILY
Status: DISCONTINUED | OUTPATIENT
Start: 2019-05-20 | End: 2019-05-21 | Stop reason: HOSPADM

## 2019-05-20 RX ORDER — MIDAZOLAM HYDROCHLORIDE 1 MG/ML
INJECTION INTRAMUSCULAR; INTRAVENOUS CODE/TRAUMA/SEDATION MEDICATION
Status: COMPLETED | OUTPATIENT
Start: 2019-05-20 | End: 2019-05-20

## 2019-05-20 RX ORDER — SODIUM CHLORIDE 0.9 % (FLUSH) 0.9 %
10 SYRINGE (ML) INJECTION
Status: DISCONTINUED | OUTPATIENT
Start: 2019-05-20 | End: 2019-05-21 | Stop reason: HOSPADM

## 2019-05-20 RX ADMIN — ACETAMINOPHEN 500 MG: 500 TABLET ORAL at 11:05

## 2019-05-20 RX ADMIN — LEVETIRACETAM 500 MG: 500 TABLET ORAL at 01:05

## 2019-05-20 RX ADMIN — FOLIC ACID 1 MG: 1 TABLET ORAL at 08:05

## 2019-05-20 RX ADMIN — RIVAROXABAN 20 MG: 20 TABLET, FILM COATED ORAL at 08:05

## 2019-05-20 RX ADMIN — FENTANYL CITRATE 50 MCG: 50 INJECTION, SOLUTION INTRAMUSCULAR; INTRAVENOUS at 10:05

## 2019-05-20 RX ADMIN — LEVOTHYROXINE SODIUM 100 MCG: 100 TABLET ORAL at 05:05

## 2019-05-20 RX ADMIN — MIDAZOLAM HYDROCHLORIDE 1 MG: 1 INJECTION, SOLUTION INTRAMUSCULAR; INTRAVENOUS at 10:05

## 2019-05-20 RX ADMIN — ACETAMINOPHEN 650 MG: 325 TABLET ORAL at 08:05

## 2019-05-20 RX ADMIN — LEVETIRACETAM 500 MG: 500 TABLET ORAL at 08:05

## 2019-05-20 NOTE — PLAN OF CARE
Problem: Adult Inpatient Plan of Care  Goal: Plan of Care Review  Outcome: Ongoing (interventions implemented as appropriate)  AAOx4.  POC reviewed at bedside.  Verbalized understanding.  Slept well overnight.  NPO since midnight.  No neuro changes noted.  No falls.  No c/o pain.  Bed in lowest position and locked.  Bed alarm on and call light within easy reach.

## 2019-05-20 NOTE — PROGRESS NOTES
[facility]  Vascular Neurology  Comprehensive Stroke Center  Progress Note    Assessment/Plan:     * Thrombosis of superior sagittal sinus  33 y/o female with Sinus thrombosis. CT head R SDH. S/p embolization of R MMA.       Antithrombotics: Will need AC for SSS thrombosis. Pending CT head for new left eye blurriness.   Statin: No need. SDH.   Risk factors modification: hypothyroid  BP: SBP <140  Diagnostics: none   VTE: SCD's; will start AC  Therapy: This patient has been evaluated by a stroke team provider, and therapy needs have been fully considered based off of their presenting complaint and exam findings.  At this time, the patient does not need formal evaluation by PT, OT, Speech and PM&R. Appropriate consults have been placed for evaluation and treatment.          Subdural hematoma  CTH- acute R SDH  NSGY consulted   Keppra per NSGY recommendation   Angio 5/20- s/p embolization of R MMA    Hypothyroidism  Stroke risk factor   Home synthroid          5/18: Admitted for SDH  5/20: s/p angio embolization of R MMA. Patient c/o left eye blurriness s/p angio otherwise stable. CT head ordered. Will likely start AC for SSS thrombosis.     STROKE DOCUMENTATION        NIH Scale:  1a. Level of Consciousness: 0-->Alert, keenly responsive  1b. LOC Questions: 0-->Answers both questions correctly  1c. LOC Commands: 0-->Performs both tasks correctly  2. Best Gaze: 0-->Normal  3. Visual: 1-->Partial hemianopia(left eye blurriness (new s/p agio))  4. Facial Palsy: 0-->Normal symmetrical movements  5a. Motor Arm, Left: 0-->No drift, limb holds 90 (or 45) degrees for full 10 secs  5b. Motor Arm, Right: 0-->No drift, limb holds 90 (or 45) degrees for full 10 secs  6a. Motor Leg, Left: 0-->No drift, leg holds 30 degree position for full 5 secs  6b. Motor Leg, Right: 0-->No drift, leg holds 30 degree position for full 5 secs  7. Limb Ataxia: 0-->Absent  8. Sensory: 0-->Normal, no sensory loss  9. Best Language:  "0-->No aphasia, normal  10. Dysarthria: 0-->Normal  11. Extinction and Inattention (formerly Neglect): 0-->No abnormality  Total (NIH Stroke Scale): 1       Modified Anderson Score: 0  [place] Coma Scale:    ABCD2 Score:    [id number] Score:   HAS -BLED Score:   ICH Score:   Hunt & Francois Classification:      Hemorrhagic change of an Ischemic Stroke: Does this patient have an ischemic stroke with hemorrhagic changes? No     Neurologic Chief Complaint: HA, body tingling, BL "hand stiffening,"    Subjective:     Interval History: Patient is seen for follow-up neurological assessment and treatment recommendations:  s/p angio embolization of R MMA. Patient c/o left eye blurriness s/p angio otherwise stable. CT head ordered. Will likely start AC for SSS thrombosis.     HPI, Past Medical, Family, and Social History remains the same as documented in the initial encounter.     Review of Systems   Constitutional: Negative for chills and fever.   Eyes: Positive for visual disturbance (left eye blurriness in peripheral).   Respiratory: Negative for shortness of breath.    Cardiovascular: Negative for chest pain.   Skin: Negative for color change.   Neurological: Negative for speech difficulty, weakness, numbness and headaches.   Psychiatric/Behavioral: Negative for agitation and confusion.     Scheduled Meds:   folic acid  1 mg Oral Daily    levETIRAcetam  500 mg Oral BID    levothyroxine  100 mcg Oral Before breakfast     Continuous Infusions:  PRN Meds:sodium chloride 0.9%, sodium chloride 0.9%    Objective:     Vital Signs (Most Recent):  Temp: 97.7 °F (36.5 °C) (05/20/19 1325)  Pulse: 65 (05/20/19 1342)  Resp: 18 (05/20/19 1325)  BP: 118/78 (05/20/19 1342)  SpO2: (!) 94 % (05/20/19 1325)  BP Location: Right arm    Vital Signs Range (Last 24H):  Temp:  [96.5 °F (35.8 °C)-98.5 °F (36.9 °C)]   Pulse:  [59-85]   Resp:  [10-18]   BP: ()/(66-88)   SpO2:  [94 %-100 %]   BP Location: Right arm    Physical Exam "   Constitutional: She is oriented to person, place, and time. She appears well-developed and well-nourished. No distress.   Eyes:   Left eye blurriness in peripheral field   Neck: Normal range of motion.   Cardiovascular: Normal rate.   Pulmonary/Chest: Effort normal.   Neurological: She is alert and oriented to person, place, and time.   Skin: Skin is warm and dry. She is not diaphoretic.   Psychiatric: She has a normal mood and affect.   Nursing note and vitals reviewed.      Neurological Exam:   LOC: alert  Attention Span: Good   Language: No aphasia  Articulation: No dysarthria  Orientation: Person, Place, Time   Visual Fields: Hemianopsia left  EOM (CN III, IV, VI): Full/intact  Facial Movement (CN VII): Symmetric facial expression    Motor: Arm left  Normal 5/5  Leg left  Normal 5/5  Arm right  Normal 5/5  Leg right Normal 5/5  Sensation: Intact to light touch, temperature and vibration  Tone: Normal tone throughout    Laboratory:  CMP: No results for input(s): GLUCOSE, CALCIUM, ALBUMIN, PROT, NA, K, CO2, CL, BUN, CREATININE, ALKPHOS, ALT, AST, BILITOT in the last 24 hours.  BMP:   Recent Labs   Lab 05/17/19  0438      K 3.8      CO2 27   BUN 12   CREATININE 1.18   CALCIUM 8.8     CBC:   Recent Labs   Lab 05/20/19  0300   WBC 5.43   RBC 4.04   HGB 11.9*   HCT 37.4      MCV 93   MCH 29.5   MCHC 31.8*     Lipid Panel:   Recent Labs   Lab 05/19/19  0314   CHOL 154   LDLCALC 93.0   HDL 41   TRIG 100     Hgb A1C:   Recent Labs   Lab 05/19/19  0314   HGBA1C 5.5     TSH:   Recent Labs   Lab 05/19/19  0314   TSH 0.761       Diagnostic Results     Brain Imaging   CT head 5/19/19  Persistent subdural hematoma extending along the right convexity with mixed density contents which measures up to 0.3 cm (coronal series 4, image 19).  High density material extending along the superior sagittal sinus in keeping with known thrombosis.  Persistent mass effect with minimal leftward midline shift measuring  approximately 2 mm, relatively unchanged.     MRI Brain w and w/o contrast 5-17-19 results:  1. Expansion of the superior sagittal sinus with apparent remodeling of the overlying calvarium.  Thin string preserved superior sagittal sinus flow with prominent collateral drainage via the veins of Ian bilaterally.  Nonenhancing material within the superior sagittal sinus is T1 isointense prominently hypointense on T2, FLAIR and susceptibility and does not restrict diffusion.  This is favored represent chronic thrombus, however there was hyperattenuation within the sinus on CT from 05/16/2019 as well as CT from 12/21/2018 and acute blood could conceivably also have this appearance.  2. Unchanged thin right convexity subdural collection compared to prior study with pachymeningeal enhancement extending along the superior sagittal sinus and right convexity adjacent to the collection.  3. 6 x 4 x 8 mm sellar lesion.  This could represent a pituitary adenoma.           Cardiac Imaging   TTE 5/20/19  · Normal left ventricular systolic function. The estimated ejection fraction is 65%  · Normal LV diastolic function.  · No wall motion abnormalities.  · Normal right ventricular systolic function.  · Mild right atrial enlargement.  · Normal size left atrium      Suhail Cat NP  [facility] Stroke Center  Department of Vascular Neurology   [facility]

## 2019-05-20 NOTE — ASSESSMENT & PLAN NOTE
CTH- acute R SDH  NSGY consulted   Keppra per NSGY recommendation   Angio 5/20- s/p embolization of R MMA

## 2019-05-20 NOTE — SUBJECTIVE & OBJECTIVE
"Neurologic Chief Complaint: HA, body tingling, BL "hand stiffening,"    Subjective:     Interval History: Patient is seen for follow-up neurological assessment and treatment recommendations:  s/p angio embolization of R MMA. Patient c/o left eye blurriness s/p angio otherwise stable. CT head ordered. Will likely start AC for SSS thrombosis.     HPI, Past Medical, Family, and Social History remains the same as documented in the initial encounter.     Review of Systems   Constitutional: Negative for chills and fever.   Eyes: Positive for visual disturbance (left eye blurriness in peripheral).   Respiratory: Negative for shortness of breath.    Cardiovascular: Negative for chest pain.   Skin: Negative for color change.   Neurological: Negative for speech difficulty, weakness, numbness and headaches.   Psychiatric/Behavioral: Negative for agitation and confusion.     Scheduled Meds:   folic acid  1 mg Oral Daily    levETIRAcetam  500 mg Oral BID    levothyroxine  100 mcg Oral Before breakfast     Continuous Infusions:  PRN Meds:sodium chloride 0.9%, sodium chloride 0.9%    Objective:     Vital Signs (Most Recent):  Temp: 97.7 °F (36.5 °C) (05/20/19 1325)  Pulse: 65 (05/20/19 1342)  Resp: 18 (05/20/19 1325)  BP: 118/78 (05/20/19 1342)  SpO2: (!) 94 % (05/20/19 1325)  BP Location: Right arm    Vital Signs Range (Last 24H):  Temp:  [96.5 °F (35.8 °C)-98.5 °F (36.9 °C)]   Pulse:  [59-85]   Resp:  [10-18]   BP: ()/(66-88)   SpO2:  [94 %-100 %]   BP Location: Right arm    Physical Exam   Constitutional: She is oriented to person, place, and time. She appears well-developed and well-nourished. No distress.   Eyes:   Left eye blurriness in peripheral field   Neck: Normal range of motion.   Cardiovascular: Normal rate.   Pulmonary/Chest: Effort normal.   Neurological: She is alert and oriented to person, place, and time.   Skin: Skin is warm and dry. She is not diaphoretic.   Psychiatric: She has a normal mood and " affect.   Nursing note and vitals reviewed.      Neurological Exam:   LOC: alert  Attention Span: Good   Language: No aphasia  Articulation: No dysarthria  Orientation: Person, Place, Time   Visual Fields: Hemianopsia left  EOM (CN III, IV, VI): Full/intact  Facial Movement (CN VII): Symmetric facial expression    Motor: Arm left  Normal 5/5  Leg left  Normal 5/5  Arm right  Normal 5/5  Leg right Normal 5/5  Sensation: Intact to light touch, temperature and vibration  Tone: Normal tone throughout    Laboratory:  CMP: No results for input(s): GLUCOSE, CALCIUM, ALBUMIN, PROT, NA, K, CO2, CL, BUN, CREATININE, ALKPHOS, ALT, AST, BILITOT in the last 24 hours.  BMP:   Recent Labs   Lab 05/17/19  0438      K 3.8      CO2 27   BUN 12   CREATININE 1.18   CALCIUM 8.8     CBC:   Recent Labs   Lab 05/20/19  0300   WBC 5.43   RBC 4.04   HGB 11.9*   HCT 37.4      MCV 93   MCH 29.5   MCHC 31.8*     Lipid Panel:   Recent Labs   Lab 05/19/19  0314   CHOL 154   LDLCALC 93.0   HDL 41   TRIG 100     Hgb A1C:   Recent Labs   Lab 05/19/19  0314   HGBA1C 5.5     TSH:   Recent Labs   Lab 05/19/19  0314   TSH 0.761       Diagnostic Results     Brain Imaging   CT head 5/19/19  Persistent subdural hematoma extending along the right convexity with mixed density contents which measures up to 0.3 cm (coronal series 4, image 19).  High density material extending along the superior sagittal sinus in keeping with known thrombosis.  Persistent mass effect with minimal leftward midline shift measuring approximately 2 mm, relatively unchanged.     MRI Brain w and w/o contrast 5-17-19 results:  1. Expansion of the superior sagittal sinus with apparent remodeling of the overlying calvarium.  Thin string preserved superior sagittal sinus flow with prominent collateral drainage via the veins of Ian bilaterally.  Nonenhancing material within the superior sagittal sinus is T1 isointense prominently hypointense on T2, FLAIR and  susceptibility and does not restrict diffusion.  This is favored represent chronic thrombus, however there was hyperattenuation within the sinus on CT from 05/16/2019 as well as CT from 12/21/2018 and acute blood could conceivably also have this appearance.  2. Unchanged thin right convexity subdural collection compared to prior study with pachymeningeal enhancement extending along the superior sagittal sinus and right convexity adjacent to the collection.  3. 6 x 4 x 8 mm sellar lesion.  This could represent a pituitary adenoma.           Cardiac Imaging   TTE 5/20/19  · Normal left ventricular systolic function. The estimated ejection fraction is 65%  · Normal LV diastolic function.  · No wall motion abnormalities.  · Normal right ventricular systolic function.  · Mild right atrial enlargement.  · Normal size left atrium

## 2019-05-20 NOTE — NURSING
Patient received from IR via stretcher. Denies pain at this time. Right femoral site WDL, dressing clean, dry, intact, no hematoma. Patient alert and oriented.

## 2019-05-20 NOTE — ASSESSMENT & PLAN NOTE
35 y/o female with Sinus thrombosis. CT head R SDH. S/p embolization of R MMA.       Antithrombotics: Will need AC for SSS thrombosis. Pending CT head for new left eye blurriness.   Statin: No need. SDH.   Risk factors modification: hypothyroid  BP: SBP <140  Diagnostics: none   VTE: SCD's; will start AC  Therapy: This patient has been evaluated by a stroke team provider, and therapy needs have been fully considered based off of their presenting complaint and exam findings.  At this time, the patient does not need formal evaluation by PT, OT, Speech and PM&R. Appropriate consults have been placed for evaluation and treatment.

## 2019-05-20 NOTE — HOSPITAL COURSE
5/18: Admitted for SDH  5/20: s/p angio embolization of R MMA. Patient c/o left eye blurriness s/p angio otherwise stable. CT head ordered. Will likely start AC for SSS thrombosis.   5/21/19 - complains of right jaw pain and headache after angio. Will discuss with IR, no new neuro changes   Discussed above complaints with Dr Negrete who recommends treatment with analagesic (tylenol).   Given ED warnings to return for any worsening new or persistent symptoms    Reviewed patients diagnosis, and treatment with patient including discharge medications  Will send info to her neurologist -Dr. Rissa Carlson    Inpatient acute stroke work up completed and patient is stable for discharge.  Please see imaging and discharge medication list below.

## 2019-05-20 NOTE — PROGRESS NOTES
Procedure complete. Pt tolerated well. Right groin site sealed with 6 Fr angioseal deployed at 1115. HOB to remain flat for 2 hours, until 1315. Site dressed with Tegaderm. Site clean, dry, intact, no bleeding, no hematoma. Report called to RN. Pt to recover in ROCU before returning to room. Report will be given to RN in ROCU at bedside.

## 2019-05-20 NOTE — PROGRESS NOTES
Pt arrived to [facility] via stretcher on RA, pt aao x;s4, report received from Zee BRADLEY

## 2019-05-20 NOTE — H&P
Radiology History & Physical      SUBJECTIVE:     Chief Complaint: Venous sinus thrombosis    History of Present Illness:  Lexus Polanco is a 34F w/ PMH hypothyroidism, CKD and SSS thrombosis (dx'd in 12/18') who presents w/ HA & paresthesias now w/ acute R SDH in the setting of persistent venous sinus thrombosis. Patient was diagnosed with SSS thrombosis last December after she had multiple months of HA and R hemibody paresthesias. She was treated by her neurologist without intervention (last visit 04/19'). Two days ago patient was driving and experienced an episode of paresthesias and has had worsening HAs over the past couple days that prompted re-presentation for reimaging. CTH demonstrated new acute R SDH. MRA demonstrated persistent SSS venous sinus thrombosis. She denies any recent head trauma. Pt is a nonsmoker. She is not on anticoagulant/antiplatelet or hormonal/ oral contraceptive medications.    Past Medical History:   Diagnosis Date    Hypothyroid     Subdural hematoma 5/17/2019     History reviewed. No pertinent surgical history.    Home Meds:   Prior to Admission medications    Medication Sig Start Date End Date Taking? Authorizing Provider   cholecalciferol, vitamin D3, (VITAMIN D3) 400 unit Chew Take by mouth.    Historical Provider, MD   folic acid (FOLVITE) 1 MG tablet Take 1 mg by mouth once daily.    Historical Provider, MD   levothyroxine (SYNTHROID) 100 MCG tablet Take 100 mcg by mouth once daily.    Historical Provider, MD     Anticoagulants/Antiplatelets: no anticoagulation    Allergies: Review of patient's allergies indicates:  No Known Allergies  Sedation History:  no adverse reactions    Review of Systems:     Constitutional: Negative for activity change, chills, fatigue, fever and unexpected weight change.   HENT: Negative for tinnitus and voice change.    Eyes: Negative for photophobia and visual disturbance.   Respiratory: Negative for cough, choking, chest tightness, shortness of  breath, wheezing and stridor.    Cardiovascular: Negative for chest pain and palpitations.   Gastrointestinal: Negative for abdominal distention, abdominal pain, constipation, diarrhea, nausea and vomiting.   Endocrine: Negative for polydipsia, polyphagia and polyuria.   Genitourinary: Negative for difficulty urinating, dysuria, frequency, hematuria and urgency.   Musculoskeletal: Negative for back pain, gait problem and neck stiffness.   Skin: Negative for pallor, rash and wound.   Neurological: Positive for numbness and headaches. Negative for dizziness, tremors, seizures, syncope, facial asymmetry, speech difficulty, weakness and light-headedness.   Psychiatric/Behavioral: Negative for agitation, behavioral problems and confusion        OBJECTIVE:     Vital Signs (Most Recent)  Temp: 97.9 °F (36.6 °C) (05/20/19 0827)  Pulse: 68 (05/20/19 0827)  Resp: 18 (05/20/19 0827)  BP: 112/76 (05/20/19 0827)  SpO2: 97 % (05/20/19 0827)    Physical Exam:  ASA: 2  Mallampati: 2+    General: no acute distress; General: AOx3  Mental Status: alert and oriented to person, place and time  HEENT: normocephalic, atraumatic  Chest: unlabored breathing  Heart: regular heart rate  Abdomen: nondistended  Extremity: moves all extremities  Neuro: CNII-XII: Intact on fine exam, PERRL, EOMi, facial sensation preserved, no facial assymetry, tongue/uvula/palate midline, shoulder shrug equal, no pronator drift  Extremities: 5/5 motor throughout, sensorium intact throughout, coordination intact throughout, no pathological reflexes, no sensory level present      Laboratory  Lab Results   Component Value Date    INR 1.1 05/17/2019       Lab Results   Component Value Date    WBC 5.43 05/20/2019    HGB 11.9 (L) 05/20/2019    HCT 37.4 05/20/2019    MCV 93 05/20/2019     05/20/2019      Lab Results   Component Value Date    GLU 94 05/17/2019     05/17/2019    K 3.8 05/17/2019     05/17/2019    CO2 27 05/17/2019    BUN 12 05/17/2019     CREATININE 1.18 05/17/2019    CALCIUM 8.8 05/17/2019    MG 1.7 05/20/2019    ALT 17 05/17/2019    AST 21 05/17/2019    ALBUMIN 3.5 05/17/2019    BILITOT 0.3 05/17/2019     URINE PREGNANCY (POC):  Negative    ASSESSMENT/PLAN:     34F w/ PMH hypothyroidism, CKD and SSS thrombosis (dx'd in 12/18') who presents w/ HA & paresthesias now w/ acute R SDH in the setting of persistent venous sinus thrombosis. Per Dr. Negrete cerebral angiography for further evaluation of vasculature to delineate management and treatment of sinus thrombosis.    Plan:  Sedation Plan: local and moderate sedation  Patient will undergo cerebral angiography with possible intervention

## 2019-05-20 NOTE — PHYSICIAN QUERY
PT Name: Lexus Polanco  MR #: [id number]     Physician Query Form - Documentation Clarification      CDS/: Tori Randle RN., CDS               Contact information: [email or web address]    This form is a permanent document in the medical record.     Query Date: May 20, 2019    By submitting this query, we are merely seeking further clarification of documentation. Please utilize your independent clinical judgment when addressing the question(s) below.    The Medical record reflects the following:    Supporting Clinical Findings Location in Medical Record     --Similar episode in 12/2018 for which work-up revealed acute on chronic SDH    --Subdural hematoma              -Stable on Imaging     --presents w/ HA & paresthesias now w/ acute R SDH in  the setting of persistent venous sinus thrombosis.     H&P 5/19          IR H&P 5/20     --Persistent subdural hematoma extending along the right convexity with mixed density contents   -- Persistent mass effect with minimal leftward midline shift measuring approximately 2 mm, relatively unchanged.  No hydrocephalus.    --S/p R MMA embolized to reduce risk of rebleeding into R SDH   CTH 5/19            IR Procedure Note 5/20                                                                            Doctor, Please specify diagnosis or diagnoses associated with above clinical findings.        Please specify any additional diagnosis associated with the above clinical indicators:    Provider Use Only    [ x ] Brain compression, clinically insignificant    [  ] Brain compression, clinically significant    [  ] Other diagnosis: _________________                                                                                                             [  ] Clinically Undetermined

## 2019-05-20 NOTE — PHYSICIAN QUERY
PT Name: Lexus Polanco  MR #: [id number]     PHYSICIAN QUERY -  ACUITY OF CONDITION CLARIFICATION      CDS/: Tori Randle RN, CDS               Contact information: [email or web address]  This form is a permanent document in the medical record.     Query Date: May 20, 2019    By submitting this query, we are merely seeking further clarification of documentation to reflect the severity of illness of your patient. Please utilize your independent clinical judgment when addressing the question(s) below.    The Medical record reflects the following:     Indicators   Supporting Clinical Findings Location in Medical Record   x Documentation of condition --Similar episode in 12/2018 for which work-up revealed acute on chronic SDH    --Subdural hematoma              -Stable on Imaging     --presents w/ HA & paresthesias now w/ acute R SDH in  the setting of persistent venous sinus thrombosis.        -Two  days ago patient was driving and experienced an episode of paresthesias and has had worsening HAs over the past couple days that prompted re-presentation for reimaging. CTH demonstrated new acute R SDH.       H&P 5/19          IR H&P 5/20    Lab Value(s)     x Radiology Findings --Nonenhancing material within the superior sagittal sinus is T1 isointense prominently hypointense on T2, FLAIR and susceptibility and does not restrict diffusion.  This is favored represent chronic thrombus,  however there was hyperattenuation within the sinus on CT from 05/16/2019 as well as CT from 12/21/2018 and acute blood could conceivably also have this appearance.  --Unchanged thin right convexity subdural collection compared to prior study     --Persistent subdural hematoma extending along the right convexity with mixed density contents    MRI 5/17 per [facility] c/s 5/19                  CTH 5/19   x Treatment                                 Medication --S/p R MMA embolized to reduce risk of rebleeding into R SDH IR  Procedure Note 5/20    Other       Provider, please clarify the acuity/chronicity of __SDH_, for this current admit:    [   ] Acute   [  x ] Chronic   [   ] Subacute   [   ] Acute and/on chronic   [   ]  Clinically Undetermined       Please document in your progress notes daily for the duration of treatment until resolved, and include in your discharge summary.

## 2019-05-20 NOTE — PROCEDURES
Radiology Post-Procedure Note    Pre Op Diagnosis: Subdural hematoma and Venous Sinus Thrombosis    Post Op Diagnosis: same    Procedure: Cerebral angiogram    Procedure performed by: Keenan Negrete MD    Written Informed Consent Obtained: Yes    Specimen Removed: NO    Estimated Blood Loss: Minimal    Procedure report:     A 6F sheath was placed into the right femoral artery and a 5F Rickie catheter was advanced into the aortic arch.  The left and right common carotid artery and left and right vertebral arteries were subselected and angiography of the brain was performed after injection into each of these vessels.    Preliminary interpretation: Sinus thrombosis from origin of superior sagittal sinus to torcula and left internal carotid artery cavernous aneurysm.  Please see Imaging report for full details. Right middle meningeal artery embolization performed    A right femoral artery angiogram was performed, the sheath removed and hemostasis achieved using angioseal.  No hematoma was present at the time of hemostasis.    The patient tolerated the procedure well.

## 2019-05-21 VITALS
TEMPERATURE: 98 F | WEIGHT: 147 LBS | OXYGEN SATURATION: 97 % | DIASTOLIC BLOOD PRESSURE: 73 MMHG | RESPIRATION RATE: 18 BRPM | HEART RATE: 70 BPM | HEIGHT: 64 IN | SYSTOLIC BLOOD PRESSURE: 112 MMHG | BODY MASS INDEX: 25.1 KG/M2

## 2019-05-21 LAB
BASOPHILS # BLD AUTO: 0.05 K/UL (ref 0–0.2)
BASOPHILS NFR BLD: 1.1 % (ref 0–1.9)
DIFFERENTIAL METHOD: ABNORMAL
EOSINOPHIL # BLD AUTO: 0.2 K/UL (ref 0–0.5)
EOSINOPHIL NFR BLD: 5.4 % (ref 0–8)
ERYTHROCYTE [DISTWIDTH] IN BLOOD BY AUTOMATED COUNT: 14 % (ref 11.5–14.5)
HCT VFR BLD AUTO: 35.7 % (ref 37–48.5)
HGB BLD-MCNC: 11.6 G/DL (ref 12–16)
IMM GRANULOCYTES # BLD AUTO: 0 K/UL (ref 0–0.04)
IMM GRANULOCYTES NFR BLD AUTO: 0 % (ref 0–0.5)
LYMPHOCYTES # BLD AUTO: 1.2 K/UL (ref 1–4.8)
LYMPHOCYTES NFR BLD: 27.9 % (ref 18–48)
MAGNESIUM SERPL-MCNC: 1.8 MG/DL (ref 1.6–2.6)
MCH RBC QN AUTO: 29.7 PG (ref 27–31)
MCHC RBC AUTO-ENTMCNC: 32.5 G/DL (ref 32–36)
MCV RBC AUTO: 91 FL (ref 82–98)
MONOCYTES # BLD AUTO: 0.4 K/UL (ref 0.3–1)
MONOCYTES NFR BLD: 9.8 % (ref 4–15)
NEUTROPHILS # BLD AUTO: 2.5 K/UL (ref 1.8–7.7)
NEUTROPHILS NFR BLD: 55.8 % (ref 38–73)
NRBC BLD-RTO: 0 /100 WBC
PHOSPHATE SERPL-MCNC: 3.5 MG/DL (ref 2.7–4.5)
PLATELET # BLD AUTO: 300 K/UL (ref 150–350)
PMV BLD AUTO: 9.8 FL (ref 9.2–12.9)
RBC # BLD AUTO: 3.91 M/UL (ref 4–5.4)
WBC # BLD AUTO: 4.41 K/UL (ref 3.9–12.7)

## 2019-05-21 PROCEDURE — 99239 PR HOSPITAL DISCHARGE DAY,>30 MIN: ICD-10-PCS | Mod: ,,, | Performed by: PSYCHIATRY & NEUROLOGY

## 2019-05-21 PROCEDURE — 99232 PR SUBSEQUENT HOSPITAL CARE,LEVL II: ICD-10-PCS | Mod: ,,, | Performed by: PHYSICIAN ASSISTANT

## 2019-05-21 PROCEDURE — 36415 COLL VENOUS BLD VENIPUNCTURE: CPT

## 2019-05-21 PROCEDURE — 85025 COMPLETE CBC W/AUTO DIFF WBC: CPT

## 2019-05-21 PROCEDURE — 25000003 PHARM REV CODE 250: Performed by: FAMILY MEDICINE

## 2019-05-21 PROCEDURE — 25000003 PHARM REV CODE 250: Performed by: NURSE PRACTITIONER

## 2019-05-21 PROCEDURE — 83735 ASSAY OF MAGNESIUM: CPT

## 2019-05-21 PROCEDURE — 84100 ASSAY OF PHOSPHORUS: CPT

## 2019-05-21 PROCEDURE — 99232 SBSQ HOSP IP/OBS MODERATE 35: CPT | Mod: ,,, | Performed by: PHYSICIAN ASSISTANT

## 2019-05-21 PROCEDURE — 99239 HOSP IP/OBS DSCHRG MGMT >30: CPT | Mod: ,,, | Performed by: PSYCHIATRY & NEUROLOGY

## 2019-05-21 PROCEDURE — 25000003 PHARM REV CODE 250: Performed by: PSYCHIATRY & NEUROLOGY

## 2019-05-21 RX ORDER — ACETAMINOPHEN 325 MG/1
650 TABLET ORAL EVERY 8 HOURS PRN
Refills: 0 | COMMUNITY
Start: 2019-05-21

## 2019-05-21 RX ORDER — LEVETIRACETAM 500 MG/1
500 TABLET ORAL 2 TIMES DAILY
Qty: 12 TABLET | Refills: 0 | Status: SHIPPED | OUTPATIENT
Start: 2019-05-21 | End: 2019-05-27

## 2019-05-21 RX ADMIN — LEVETIRACETAM 500 MG: 500 TABLET ORAL at 08:05

## 2019-05-21 RX ADMIN — LEVOTHYROXINE SODIUM 100 MCG: 100 TABLET ORAL at 06:05

## 2019-05-21 RX ADMIN — FOLIC ACID 1 MG: 1 TABLET ORAL at 08:05

## 2019-05-21 RX ADMIN — ACETAMINOPHEN 650 MG: 325 TABLET ORAL at 08:05

## 2019-05-21 NOTE — ASSESSMENT & PLAN NOTE
34 year old female with a PMHx of hypothyroidism, CKD, and SSS thrombosis (dx'd in 12/18'), who presents wtih HA and paresthesias. Imaging shows an acute R SDH in the setting of persistent venous sinus thrombosis. Now s/p embolization of R MMA on 5/20.    -Patient neurologically stable on exam  -R SDH: Head CT stable. No acute neurosurgical intervention indicated. Continue Keppra x 7 days for seizure prevention.  -HTN: Maintain SBP < 140  -SSS thrombosis: Started on Xarelto today per Stroke.    -Notify NSGY for any changes in exam or mental status

## 2019-05-21 NOTE — PLAN OF CARE
05/21/19 1151   Post-Acute Status   Post-Acute Authorization Other   Other Status No Post-Acute Service Needs

## 2019-05-21 NOTE — SUBJECTIVE & OBJECTIVE
Interval History:   NAEON. Patient resting comfortably in bed. No family at bedside. Patient reports mild right sided neck and face pain. Also complains of right hip flexor pain. Denies headaches, dizziness, N/V, vision changes, or weakness.     Medications:  Continuous Infusions:  Scheduled Meds:   folic acid  1 mg Oral Daily    levETIRAcetam  500 mg Oral BID    levothyroxine  100 mcg Oral Before breakfast    rivaroxaban  20 mg Oral Daily with dinner     PRN Meds:acetaminophen, sodium chloride 0.9%, sodium chloride 0.9%     Review of Systems  Objective:     Weight: 66.7 kg (147 lb)  Body mass index is 25.23 kg/m².  Vital Signs (Most Recent):  Temp: 97.8 °F (36.6 °C) (05/21/19 1142)  Pulse: 70 (05/21/19 1142)  Resp: 18 (05/21/19 1142)  BP: 112/73 (05/21/19 1142)  SpO2: 97 % (05/21/19 1142) Vital Signs (24h Range):  Temp:  [97.2 °F (36.2 °C)-98 °F (36.7 °C)] 97.8 °F (36.6 °C)  Pulse:  [61-89] 70  Resp:  [12-18] 18  SpO2:  [94 %-98 %] 97 %  BP: (102-126)/(63-85) 112/73         Neurosurgery Physical Exam   General: well developed, well nourished, no distress.   Head: normocephalic  Neurologic: Alert and oriented. Thought content appropriate.  GCS: Motor: 6/Verbal: 5/Eyes: 4 GCS Total: 15  Mental Status: Awake, Alert, Oriented x 4  Language: No aphasia  Speech: No dysarthria  Cranial nerves: face symmetric, tongue midline, CN II-XII grossly intact.   Eyes: pupils equal, round, reactive to light with accomodation, EOMI.   Pulmonary: normal respirations, no signs of respiratory distress  Abdomen: soft, non-distended, not tender to palpation  Skin: Skin is warm, dry and intact.  Sensory: intact to light touch throughout    Motor Strength: Moves all extremities spontaneously with good tone. No abnormal movements seen. Proximal RLE is pain limited.     Strength  Deltoids Triceps Biceps Wrist Extension Wrist Flexion Hand    Upper: R 5/5 5/5 5/5 5/5 5/5 5/5    L 5/5 5/5 5/5 5/5 5/5 5/5     Iliopsoas Quadriceps  Knee  Flexion Tibialis  anterior Gastro- cnemius EHL   Lower: R 4/5 4+/5 5-/5 5/5 5/5 5/5    L 5/5 5/5 5/5 5/5 5/5 5/5     Babinski's: Negative.  Clonus: Negative.  Finger-to-nose: intact bilaterally   Pronator drift: absent bilaterally     Groin site:  Clean, dry, bandage intact. No surrounding erythema or edema. No evidence of a hematoma. No drainage or TTP.       Significant Labs:  Recent Labs   Lab 05/20/19  0300 05/21/19  0415   MG 1.7 1.8     Recent Labs   Lab 05/20/19  0300 05/21/19  0415   WBC 5.43 4.41   HGB 11.9* 11.6*   HCT 37.4 35.7*    300         Significant Diagnostics:  Head CT on 5/20:  I independently reviewed the imaging.     Heterogeneous hyper attenuation along the superior sagittal sinus diffusely corresponding to abnormality seen on prior studies and most compatible with superior sagittal sinus thrombosis and questioned partial enhancement.    Thin extra-axial hyperdense collection overlies the right cerebral convexity concerning for small volume subdural hemorrhage.

## 2019-05-21 NOTE — SUBJECTIVE & OBJECTIVE
"Neurologic Chief Complaint: HA, body tingling, BL "hand stiffening,"    Subjective:     Interval History: Patient is seen for follow-up neurological assessment and treatment recommendations:   complains of right jaw pain and headache after angio. Will discuss with IR, no new neuro changes     HPI, Past Medical, Family, and Social History remains the same as documented in the initial encounter.     Review of Systems   Constitutional: Negative for chills and fever.   Musculoskeletal: Negative for neck pain.   Neurological: Negative for speech difficulty, weakness, numbness and headaches.   Psychiatric/Behavioral: Negative for agitation and confusion.     Scheduled Meds:   folic acid  1 mg Oral Daily    levETIRAcetam  500 mg Oral BID    levothyroxine  100 mcg Oral Before breakfast    rivaroxaban  20 mg Oral Daily with dinner     Continuous Infusions:  PRN Meds:acetaminophen, sodium chloride 0.9%, sodium chloride 0.9%    Objective:     Vital Signs (Most Recent):  Temp: 97.2 °F (36.2 °C) (05/21/19 0729)  Pulse: 72(Simultaneous filing. User may not have seen previous data.) (05/21/19 0729)  Resp: 12 (05/21/19 0729)  BP: 108/72 (05/21/19 0729)  SpO2: (!) 94 % (05/21/19 0729)  BP Location: Left arm    Vital Signs Range (Last 24H):  Temp:  [97.2 °F (36.2 °C)-98.5 °F (36.9 °C)]   Pulse:  [59-89]   Resp:  [10-18]   BP: (102-135)/(63-85)   SpO2:  [94 %-100 %]   BP Location: Left arm    Physical Exam   Constitutional: She is oriented to person, place, and time. She appears well-developed and well-nourished. No distress.   Neck: Normal range of motion.   Cardiovascular: Normal rate.   Pulmonary/Chest: Effort normal.   Neurological: She is alert and oriented to person, place, and time.   Skin: Skin is warm and dry. She is not diaphoretic.   Groin site examined - bandaged - clean and dry, area soft    Psychiatric: She has a normal mood and affect.   Nursing note and vitals reviewed.      Neurological Exam:   LOC: " alert  Attention Span: Good   Language: No aphasia  Articulation: No dysarthria  Orientation: Person, Place, Time   Visual Fields: intact, blurriness improved   EOM (CN III, IV, VI): Full/intact  Facial Movement (CN VII): Symmetric facial expression    Motor: Arm left  Normal 5/5  Leg left  Normal 5/5  Arm right  Normal 5/5  Leg right Normal 5/5  Sensation: Intact to light touch, temperature and vibration  Tone: Normal tone throughout    Laboratory:  CMP: No results for input(s): GLUCOSE, CALCIUM, ALBUMIN, PROT, NA, K, CO2, CL, BUN, CREATININE, ALKPHOS, ALT, AST, BILITOT in the last 24 hours.  BMP:   Recent Labs   Lab 05/17/19  0438      K 3.8      CO2 27   BUN 12   CREATININE 1.18   CALCIUM 8.8     CBC:   Recent Labs   Lab 05/21/19  0415   WBC 4.41   RBC 3.91*   HGB 11.6*   HCT 35.7*      MCV 91   MCH 29.7   MCHC 32.5     Lipid Panel:   Recent Labs   Lab 05/19/19  0314   CHOL 154   LDLCALC 93.0   HDL 41   TRIG 100     Hgb A1C:   Recent Labs   Lab 05/19/19  0314   HGBA1C 5.5     TSH:   Recent Labs   Lab 05/19/19  0314   TSH 0.761       Diagnostic Results     Brain Imaging   CT head 5/19/19  Persistent subdural hematoma extending along the right convexity with mixed density contents which measures up to 0.3 cm (coronal series 4, image 19).  High density material extending along the superior sagittal sinus in keeping with known thrombosis.  Persistent mass effect with minimal leftward midline shift measuring approximately 2 mm, relatively unchanged.     MRI Brain w and w/o contrast 5-17-19 results:  1. Expansion of the superior sagittal sinus with apparent remodeling of the overlying calvarium.  Thin string preserved superior sagittal sinus flow with prominent collateral drainage via the veins of Ian bilaterally.  Nonenhancing material within the superior sagittal sinus is T1 isointense prominently hypointense on T2, FLAIR and susceptibility and does not restrict diffusion.  This is favored  represent chronic thrombus, however there was hyperattenuation within the sinus on CT from 05/16/2019 as well as CT from 12/21/2018 and acute blood could conceivably also have this appearance.  2. Unchanged thin right convexity subdural collection compared to prior study with pachymeningeal enhancement extending along the superior sagittal sinus and right convexity adjacent to the collection.  3. 6 x 4 x 8 mm sellar lesion.  This could represent a pituitary adenoma.       Cardiac Imaging   TTE 5/20/19  · Normal left ventricular systolic function. The estimated ejection fraction is 65%  · Normal LV diastolic function.  · No wall motion abnormalities.  · Normal right ventricular systolic function.  · Mild right atrial enlargement.  · Normal size left atrium    IR angio 5/20/19  1. Superior sagittal sinus thrombosis.    2.  Embolization of the right middle meningeal artery using PVA micro particles.    CT head 5/20/19  Heterogeneous hyper attenuation along the superior sagittal sinus diffusely corresponding to abnormality seen on prior studies and most compatible with superior sagittal sinus thrombosis and questioned partial enhancement.    Thin extra-axial hyperdense collection overlies the right cerebral convexity concerning for small volume subdural hemorrhage.

## 2019-05-21 NOTE — PLAN OF CARE
05/20/19 1352   Discharge Assessment   Assessment Type Discharge Planning Assessment   Confirmed/corrected address and phone number on facesheet? Yes   Assessment information obtained from? Patient   Expected Length of Stay (days)   (TBD)   Communicated expected length of stay with patient/caregiver yes   Prior to hospitilization cognitive status: Alert/Oriented;No Deficits   Prior to hospitalization functional status: Independent   Current cognitive status: Alert/Oriented;No Deficits   Current Functional Status: Independent   Lives With alone   Able to Return to Prior Arrangements yes   Is patient able to care for self after discharge? Yes   Who are your caregiver(s) and their phone number(s)? Greg Diaz  [phone]   Patient's perception of discharge disposition home or selfcare   Readmission Within the Last 30 Days no previous admission in last 30 days   Patient currently being followed by outpatient case management? No   Patient currently receives any other outside agency services? No   Equipment Currently Used at Home none   Do you have any problems affording any of your prescribed medications? No   Is the patient taking medications as prescribed? yes   Does the patient have transportation home? Yes   Transportation Anticipated family or friend will provide   Does the patient receive services at the [facility]? No   Discharge Plan A Home   Discharge Plan B Home with family   DME Needed Upon Discharge  none   Patient/Family in Agreement with Plan yes

## 2019-05-21 NOTE — PLAN OF CARE
05/21/19 1422   Final Note   Assessment Type Final Discharge Note   Anticipated Discharge Disposition Home   Right Care Referral Info   Post Acute Recommendation No Care

## 2019-05-21 NOTE — CHAPLAIN
Daughter is graduating this week, pt wants to be there.  encouraged her not to risk leaving too soon.

## 2019-05-21 NOTE — PROGRESS NOTES
[facility]  Neurosurgery  Progress Note    Subjective:     History of Present Illness: 34F w/ PMH hypothyroidism, CKD and SSS thrombosis (dx'd in 12/18') who presents w/ HA & paresthesias now w/ acute R SDH in the setting of persistent venous sinus thrombosis. Patient was diagnosed with SSS thrombosis last December after she had multiple months of HA and R hemibody paresthesias. She was treated by her neurologist without intervention (last visit 04/19'). Two days ago patient was driving and experienced an episode of paresthesias and has had worsening HAs over the past couple days that prompted re-presentation for reimaging. CTH demonstrated new acute R SDH. MRA demonstrated persistent SSS venous sinus thrombosis. She denies any recent head trauma. Pt is a nonsmoker. She is not on anticoagulant/antiplatelet or hormonal/ oral contraceptive medications.    Post-Op Info:  Procedure(s) (LRB):  ANGIOGRAM-CEREBRAL First start (N/A)         Interval History:   NAEON. Patient resting comfortably in bed. No family at bedside. Patient reports mild right sided neck and face pain. Also complains of right hip flexor pain. Denies headaches, dizziness, N/V, vision changes, or weakness.     Medications:  Continuous Infusions:  Scheduled Meds:   folic acid  1 mg Oral Daily    levETIRAcetam  500 mg Oral BID    levothyroxine  100 mcg Oral Before breakfast    rivaroxaban  20 mg Oral Daily with dinner     PRN Meds:acetaminophen, sodium chloride 0.9%, sodium chloride 0.9%     Review of Systems  Objective:     Weight: 66.7 kg (147 lb)  Body mass index is 25.23 kg/m².  Vital Signs (Most Recent):  Temp: 97.8 °F (36.6 °C) (05/21/19 1142)  Pulse: 70 (05/21/19 1142)  Resp: 18 (05/21/19 1142)  BP: 112/73 (05/21/19 1142)  SpO2: 97 % (05/21/19 1142) Vital Signs (24h Range):  Temp:  [97.2 °F (36.2 °C)-98 °F (36.7 °C)] 97.8 °F (36.6 °C)  Pulse:  [61-89] 70  Resp:  [12-18] 18  SpO2:  [94 %-98 %] 97 %  BP: (102-126)/(63-85)  112/73         Neurosurgery Physical Exam   General: well developed, well nourished, no distress.   Head: normocephalic  Neurologic: Alert and oriented. Thought content appropriate.  GCS: Motor: 6/Verbal: 5/Eyes: 4 GCS Total: 15  Mental Status: Awake, Alert, Oriented x 4  Language: No aphasia  Speech: No dysarthria  Cranial nerves: face symmetric, tongue midline, CN II-XII grossly intact.   Eyes: pupils equal, round, reactive to light with accomodation, EOMI.   Pulmonary: normal respirations, no signs of respiratory distress  Abdomen: soft, non-distended, not tender to palpation  Skin: Skin is warm, dry and intact.  Sensory: intact to light touch throughout    Motor Strength: Moves all extremities spontaneously with good tone. No abnormal movements seen. Proximal RLE is pain limited.     Strength  Deltoids Triceps Biceps Wrist Extension Wrist Flexion Hand    Upper: R 5/5 5/5 5/5 5/5 5/5 5/5    L 5/5 5/5 5/5 5/5 5/5 5/5     Iliopsoas Quadriceps Knee  Flexion Tibialis  anterior Gastro- cnemius EHL   Lower: R 4/5 4+/5 5-/5 5/5 5/5 5/5    L 5/5 5/5 5/5 5/5 5/5 5/5     Babinski's: Negative.  Clonus: Negative.  Finger-to-nose: intact bilaterally   Pronator drift: absent bilaterally     Groin site:  Clean, dry, bandage intact. No surrounding erythema or edema. No evidence of a hematoma. No drainage or TTP.       Significant Labs:  Recent Labs   Lab 05/20/19  0300 05/21/19  0415   MG 1.7 1.8     Recent Labs   Lab 05/20/19  0300 05/21/19  0415   WBC 5.43 4.41   HGB 11.9* 11.6*   HCT 37.4 35.7*    300         Significant Diagnostics:  Head CT on 5/20:  I independently reviewed the imaging.     Heterogeneous hyper attenuation along the superior sagittal sinus diffusely corresponding to abnormality seen on prior studies and most compatible with superior sagittal sinus thrombosis and questioned partial enhancement.    Thin extra-axial hyperdense collection overlies the right cerebral convexity concerning for small  volume subdural hemorrhage.    Assessment/Plan:     * Thrombosis of superior sagittal sinus  34 year old female with a PMHx of hypothyroidism, CKD, and SSS thrombosis (dx'd in 12/18'), who presents wtih HA and paresthesias. Imaging shows an acute R SDH in the setting of persistent venous sinus thrombosis. Now s/p embolization of R MMA on 5/20.    -Patient neurologically stable on exam  -R SDH: Head CT stable. No acute neurosurgical intervention indicated. Continue Keppra x 7 days for seizure prevention.  -HTN: Maintain SBP < 140  -SSS thrombosis: Started on Xarelto today per Stroke.    -Notify NSGY for any changes in exam or mental status        Discussed with Dr. Rice  Please call with any questions      Criselda Rivera PA-C   Neurosurgery   Pager: [phone]

## 2019-05-21 NOTE — ASSESSMENT & PLAN NOTE
35 y/o female with Sinus thrombosis. CT head R SDH. S/p embolization of R MMA.       Antithrombotics: xarelto started   Statin: No need. SDH.   Risk factors modification: hypothyroid  BP: SBP <140  Diagnostics: none   VTE: SCD's xarelto  Therapy: This patient has been evaluated by a stroke team provider, and therapy needs have been fully considered based off of their presenting complaint and exam findings.  At this time, the patient does not need formal evaluation by PT, OT, Speech and PM&R. Appropriate consults have been placed for evaluation and treatment.    Discussed with IR - Dr Negrete - can treat jaw pain/headache with analgesics

## 2019-05-22 NOTE — DISCHARGE SUMMARY
"[facility]  Vascular Neurology  Comprehensive Stroke Center  Discharge Summary     Summary:     Admit Date: 5/18/2019  9:25 PM    Discharge Date and Time: 5/21/2019  4:11 PM    Attending Physician: Dr Lita Diamond     Discharge Provider: CHELO Gould    History of Present Illness: 34 year old right handed female with history of hypothyroidism, Stage 2 CKD and SDH presented to [facility] 5-17-19  with c/o headache, generalized body tingling, and bilateral "hand-stiffening." She was seen 12/2018 in [place] near her home where a MRI and CT was performed and patient was diagnosed with chronic SDH and venous thrombosis. A  head CT was repeated upon arrival to [facility] which showed acute right convexity SDH. Pt also experienced slurred speech, blurred vision, left sided numbness during symptomatic episodes.  Pt states that she has suffered with episodic headaches and fatigue since 2015 that she treats with tylenol. She has since been followed closely by her Neurologist (last visit 4/2019) with no intervention. She denies any recent head trauma.Patient was transferred to [facility] on 5-18-19 for further evaluation and cerebral angiogram.   Risk factors hypothyroid    Hospital Course (synopsis of major diagnoses, care, treatment, and services provided during the course of the hospital stay): 5/18: Admitted for SDH  5/20: s/p angio embolization of R MMA. Patient c/o left eye blurriness s/p angio otherwise stable. CT head ordered. Will likely start AC for SSS thrombosis.   5/21/19 - complains of right jaw pain and headache after angio. Will discuss with IR, no new neuro changes   Discussed above complaints with Dr Negrete who recommends treatment with analagesic (tylenol).   Given ED warnings to return for any worsening new or persistent symptoms    Reviewed patients diagnosis, and treatment with patient including discharge medications  Will send info to her neurologist -Dr. Rissa Carlson    Inpatient " acute stroke work up completed and patient is stable for discharge.  Please see imaging and discharge medication list below.      Stroke Etiology: SDH with venous thrombosis    STROKE DOCUMENTATION         NIH Scale:  1a. Level of Consciousness: 0-->Alert, keenly responsive  1b. LOC Questions: 0-->Answers both questions correctly  1c. LOC Commands: 0-->Performs both tasks correctly  2. Best Gaze: 0-->Normal  3. Visual: 0-->No visual loss  4. Facial Palsy: 0-->Normal symmetrical movements  5a. Motor Arm, Left: 0-->No drift, limb holds 90 (or 45) degrees for full 10 secs  5b. Motor Arm, Right: 0-->No drift, limb holds 90 (or 45) degrees for full 10 secs  6a. Motor Leg, Left: 0-->No drift, leg holds 30 degree position for full 5 secs  6b. Motor Leg, Right: 0-->No drift, leg holds 30 degree position for full 5 secs  7. Limb Ataxia: 0-->Absent  8. Sensory: 0-->Normal, no sensory loss  9. Best Language: 0-->No aphasia, normal  10. Dysarthria: 0-->Normal  11. Extinction and Inattention (formerly Neglect): 0-->No abnormality  Total (NIH Stroke Scale): 0        Modified Doland Score: 0  Brogue Coma Scale:    ABCD2 Score:    [id number] Score:   HAS -BLED Score:   ICH Score:   Hunt & Francois Classification:       Assessment/Plan:     Diagnostic Results:      Brain Imaging:   Brain Imaging   CT head 5/19/19  Persistent subdural hematoma extending along the right convexity with mixed density contents which measures up to 0.3 cm (coronal series 4, image 19).  High density material extending along the superior sagittal sinus in keeping with known thrombosis.  Persistent mass effect with minimal leftward midline shift measuring approximately 2 mm, relatively unchanged.      MRI Brain w and w/o contrast 5-17-19 results:  1. Expansion of the superior sagittal sinus with apparent remodeling of the overlying calvarium.  Thin string preserved superior sagittal sinus flow with prominent collateral drainage via the veins of Ian  bilaterally.  Nonenhancing material within the superior sagittal sinus is T1 isointense prominently hypointense on T2, FLAIR and susceptibility and does not restrict diffusion.  This is favored represent chronic thrombus, however there was hyperattenuation within the sinus on CT from 05/16/2019 as well as CT from 12/21/2018 and acute blood could conceivably also have this appearance.  2. Unchanged thin right convexity subdural collection compared to prior study with pachymeningeal enhancement extending along the superior sagittal sinus and right convexity adjacent to the collection.  3. 6 x 4 x 8 mm sellar lesion.  This could represent a pituitary adenoma.        Cardiac Imaging   TTE 5/20/19  · Normal left ventricular systolic function. The estimated ejection fraction is 65%  · Normal LV diastolic function.  · No wall motion abnormalities.  · Normal right ventricular systolic function.  · Mild right atrial enlargement.  · Normal size left atrium     IR angio 5/20/19  1. Superior sagittal sinus thrombosis.    2.  Embolization of the right middle meningeal artery using PVA micro particles.     CT head 5/20/19  Heterogeneous hyper attenuation along the superior sagittal sinus diffusely corresponding to abnormality seen on prior studies and most compatible with superior sagittal sinus thrombosis and questioned partial enhancement.    Thin extra-axial hyperdense collection overlies the right cerebral convexity concerning for small volume subdural hemorrhage.       Interventions: angio with embolization of R MMA     Complications: None    Disposition: Home or Self Care    Final Active Diagnoses:    Diagnosis Date Noted POA    PRINCIPAL PROBLEM:  Thrombosis of superior sagittal sinus [G08] 05/19/2019 Yes    Seizure prophylaxis [Z29.8]  Not Applicable    Cerebral venous thrombosis of cortical vein [G08] 05/19/2019 Yes    Subdural hematoma [S06.5X9A] 05/17/2019 Yes    Hypothyroidism [E03.9] 05/17/2019 Yes      Problems  Resolved During this Admission:     * Thrombosis of superior sagittal sinus  35 y/o female with Sinus thrombosis. CT head R SDH. S/p embolization of R MMA.     Antithrombotics: xarelto started   Statin: No need. SDH.   Risk factors modification: hypothyroid  BP: SBP <140  Diagnostics: none   VTE: SCD's xarelto  Therapy: This patient has been evaluated by a stroke team provider, and therapy needs have been fully considered based off of their presenting complaint and exam findings.  At this time, the patient does not need formal evaluation by PT, OT, Speech and PM&R. Appropriate consults have been placed for evaluation and treatment.    Discussed with IR - Dr Negrete - can treat jaw pain/headache with analgesics   Given ED warnings for any worsening new or persistent symptoms  IR angio site clean , dry and intact, given info to nurse regarding dressing care .      Seizure prophylaxis  Advised to continue keppra for 7 days - 500 bid, no need for weaning, discussed with neurosurg     Subdural hematoma  CTH- acute R SDH  NSGY consulted   Keppra per NSGY recommendation   Angio 5/20- s/p embolization of R MMA    Hypothyroidism  Stroke risk factor   Home synthroid         Recommendations:     Post-discharge complication risks: None    Stroke Education given to: patient    Follow-up in Stroke Clinic in 4-6 weeks  PCP in one week   Rissa Carlson - in 1-2 weeks (her neurologist)      Discharge Plan:  Anticoagulant: xarelto  Aggresive risk factor modification:  Thyroid disease  May need hypercoag workup   keppra x 1 week - 500 bid       Follow Up:  Follow-up Information     [facility]. Schedule an appointment as soon as possible for a visit in 4 weeks.    Specialty:  Vascular Neurology  Why:  Office will contact you to schedule an appointment in 4-6 weeks.   Contact information:  [address]  [facility] [id number] [phone]           Veronica Andino NP On 6/3/2019.    Specialty:  Internal  Medicine  Why:  Hospital Follow-up/ Appt at 10;15am  Contact information:  [facility]  [place] [address]  [phone]                   Patient Instructions:   No discharge procedures on file.    Medications:  Reconciled Home Medications:      Medication List      START taking these medications    acetaminophen 325 MG tablet  Commonly known as:  TYLENOL  Take 2 tablets (650 mg total) by mouth every 8 (eight) hours as needed.     levETIRAcetam 500 MG Tab  Commonly known as:  KEPPRA  Take 1 tablet (500 mg total) by mouth 2 (two) times daily. for 6 days     rivaroxaban 20 mg Tab  Commonly known as:  XARELTO  Take 1 tablet (20 mg total) by mouth daily with dinner or evening meal.        CONTINUE taking these medications    folic acid 1 MG tablet  Commonly known as:  FOLVITE  Take 1 mg by mouth once daily.     levothyroxine 100 MCG tablet  Commonly known as:  SYNTHROID  Take 100 mcg by mouth once daily.     VITAMIN D3 400 unit Chew  Generic drug:  cholecalciferol (vitamin D3)  Take by mouth.            CHELO Gould  [facility]  Department of Vascular Neurology   [facility]

## 2019-05-22 NOTE — ASSESSMENT & PLAN NOTE
33 y/o female with Sinus thrombosis. CT head R SDH. S/p embolization of R MMA.     Antithrombotics: xarelto started   Statin: No need. SDH.   Risk factors modification: hypothyroid  BP: SBP <140  Diagnostics: none   VTE: SCD's xarelto  Therapy: This patient has been evaluated by a stroke team provider, and therapy needs have been fully considered based off of their presenting complaint and exam findings.  At this time, the patient does not need formal evaluation by PT, OT, Speech and PM&R. Appropriate consults have been placed for evaluation and treatment.    Discussed with IR - Dr Negrete - can treat jaw pain/headache with analgesics   Given ED warnings for any worsening new or persistent symptoms  IR angio site clean , dry and intact, given info to nurse regarding dressing care .

## 2019-05-23 NOTE — PHYSICIAN QUERY
"PT Name: Lexus Polanco  MR #: [id number]     Physician Query Form - Documentation Clarification      CDS/: Tori Randle RN, CDS               Contact information: [email or web address]    This form is a permanent document in the medical record.     Query Date: May 23, 2019    By submitting this query, we are merely seeking further clarification of documentation. Please utilize your independent clinical judgment when addressing the question(s) below.    The Medical record reflects the following:    Supporting Clinical Findings Location in Medical Record     --Thrombosis of superior sagittal sinus           -presented to OSH yesterday with complaint of headache, right sided body "tingling", bilateral hand stiffening.            -Similar episode in 12/2018 for which work-up revealed acute on chronic SDH and Superior Sagittal Sinus Thrombosis.     --presented to [facility] 5-17-19  with c/o headache, generalized body tingling, and bilateral "hand-stiffening."   --She was seen 12/2018 in [place] near her home where a MRI and CT was performed and patient was diagnosed with chronic SDH and venous thrombosis. A  head CT was repeated upon arrival to [facility] which showed acute right convexity SDH. She denies any recent head trauma   NCC H&P 5/19            Vas Neuro C/s 5/19     --Thrombosis of superior sagittal sinus        Discharge Summary                                                                            Doctor, Please specify diagnosis or diagnoses associated with above clinical findings.        Please specify the etiology/type of Thrombosis of superior sagittal sinus:    Provider Use Only    [  ] Pyogenic    [ x ] Nonpyogenic    [  ] Unspecified    [  ] Other: ____________________                                                                                                             [  ] Clinically Undetermined               "

## 2019-06-03 ENCOUNTER — TELEPHONE (OUTPATIENT)
Dept: NEUROLOGY | Facility: CLINIC | Age: 34
End: 2019-06-03

## 2019-06-03 NOTE — TELEPHONE ENCOUNTER
Called patient to schedule f/u appt after discharge from stroke unit, was not able to leave message non working number.

## 2019-06-03 NOTE — TELEPHONE ENCOUNTER
----- Message from Cristina Bansal RN sent at 5/21/2019  2:30 PM CDT -----  Please schedule a neuro followup appt for 4-6 weeks. Patient was inpatient and seen by Dr Diamond. Please contact patient with date and time of appt.

## 2019-07-17 ENCOUNTER — HISTORICAL (OUTPATIENT)
Dept: RADIOLOGY | Facility: HOSPITAL | Age: 34
End: 2019-07-17

## 2020-04-02 NOTE — HPI
"33 y/o F with hx of Iron Deficiency Anemia and CKD who presented to OSH yesterday with complaint of headache, right sided body "tingling", bilateral hand stiffening.     Similar episode in 12/2018 for which work-up revealed acute on chronic SDH and Superior Sagittal Sinus Thrombosis.   No intervention / anticoagulation on prior evaluation.   CT and MRI today consistent with prior imaging studies.     Denies head trauma, anticoagulation / oral contraception.  Neurologic complaints have resolved.  "
"34 year old right handed female with history of hypothyroidism, Stage 2 CKD and SDH presented to [facility] 5-17-19  with c/o headache, generalized body tingling, and bilateral "hand-stiffening." She was seen 12/2018 in [place] near her home where a MRI and CT was performed and patient was diagnosed with chronic SDH and venous thrombosis. A  head CT was repeated upon arrival to [facility] which showed acute right convexity SDH. Pt also experienced slurred speech, blurred vision, left sided numbness during symptomatic episodes.  Pt states that she has suffered with episodic headaches and fatigue since 2015 that she treats with tylenol. She has since been followed closely by her Neurologist (last visit 4/2019) with no intervention. She denies any recent head trauma.Patient was transferred to [facility] on 5-18-19 for further evaluation and cerebral angiogram.   Risk factors hypothyroid  "
34F w/ PMH hypothyroidism, CKD and SSS thrombosis (dx'd in 12/18') who presents w/ HA & paresthesias now w/ acute R SDH in the setting of persistent venous sinus thrombosis. Patient was diagnosed with SSS thrombosis last December after she had multiple months of HA and R hemibody paresthesias. She was treated by her neurologist without intervention (last visit 04/19'). Two days ago patient was driving and experienced an episode of paresthesias and has had worsening HAs over the past couple days that prompted re-presentation for reimaging. CTH demonstrated new acute R SDH. MRA demonstrated persistent SSS venous sinus thrombosis. She denies any recent head trauma. Pt is a nonsmoker. She is not on anticoagulant/antiplatelet or hormonal/ oral contraceptive medications.  
Statement Selected

## 2023-01-14 NOTE — CONSULTS
"[facility]  Adult Nutrition  Consult Note    SUMMARY       Recommendations    Recommendation/Intervention:     1. Continue cardiac diet as tolerated.    Pt with good appetite and po intake at this time.     2. If po intake <50%, recommend adding Boost Plus TID.   3. RD following.     Goals: meet >85% EEN/EPN  Nutrition Goal Status: new  Communication of RD Recs: (POC)    Reason for Assessment    Reason For Assessment: consult  Diagnosis: (SHD)  Relevant Medical History: anemia, CKDII, dx 12/2018 with chronic SDH and venous thrombosis in [place]  General Information Comments: Spoke with RN - pt with normal appetite, ate 100% of breakfast. Pt reports good appetite now and PTA with stable wt. NFPE not warranted. Pt appears nourished.  Nutrition Discharge Planning: adequuate po intake    Nutrition Risk Screen    Nutrition Risk Screen: no indicators present    Nutrition/Diet History    Spiritual, Cultural Beliefs, Nondenominational Practices, Values that Affect Care: no  Factors Affecting Nutritional Intake: None identified at this time    Anthropometrics    Temp: 98 °F (36.7 °C)  Height Method: Stated  Height: 5' 4" (162.6 cm)  Height (inches): 64 in  Weight Method: Bed Scale  Weight: 66.7 kg (147 lb)  Weight (lb): 147 lb  Ideal Body Weight (IBW), Female: 120 lb  % Ideal Body Weight, Female (lb): 122.5 lb  BMI (Calculated): 25.3  BMI Grade: 25 - 29.9 - overweight     Lab/Procedures/Meds    Pertinent Labs Reviewed: reviewed  Pertinent Medications Reviewed: reviewed  Pertinent Medications Comments: folic acid, levothyroxine    Estimated/Assessed Needs    Weight Used For Calorie Calculations: 66.7 kg (147 lb 0.8 oz)  Energy Calorie Requirements (kcal): 1678 kcal/day  Energy Need Method: [place]-St Jeor(x 1.25)  Protein Requirements: 60 gm/day(1.0 gm/kg)  Weight Used For Protein Calculations: 66.7 kg (147 lb 0.8 oz)  Fluid Requirements (mL): 1 mL/kcal or per MD     RDA Method (mL): 1678     Nutrition " Problem: Discharge Planning  Goal: Discharge to home or other facility with appropriate resources  1/14/2023 0022 by Gala Walters RN  Outcome: Progressing  Note: Discharge teaching and instructions for diagnosis/procedure explained with patient using teachback method. Patient voiced understanding regarding prescriptions, follow up appointments, and care of self at home. 1/13/2023 1828 by Rogelio Prater RN  Outcome: Progressing     Problem: Skin/Tissue Integrity  Goal: Absence of new skin breakdown  Description: 1. Monitor for areas of redness and/or skin breakdown  2. Assess vascular access sites hourly  3. Every 4-6 hours minimum:  Change oxygen saturation probe site  4. Every 4-6 hours:  If on nasal continuous positive airway pressure, respiratory therapy assess nares and determine need for appliance change or resting period. Outcome: Progressing  Note: Continuing to monitor for skin integrity risks. Patient independent with turning/repositioning. Turning/repositioning encouraged at least once every 2 hrs, and prn basis. Hygiene care being completed independently per patient; assistance provided when deemed necessary. Problem: ABCDS Injury Assessment  Goal: Absence of physical injury  Outcome: Progressing  Note: Non-skid socks in place, up with assistance, bed in lowest position, bed exit & alarm as needed, provide toileting every 2 hours an d as needed. Problem: Safety - Adult  Goal: Free from fall injury  1/14/2023 0022 by Gala Walters RN  Outcome: Progressing  Note: Pt fall risk, fall band present, falling star, safety alarm activated and in use as needed. Hourly rounding performed. Pt encouraged to use call light. See Pat Maxwell fall risk assessment.    1/13/2023 1828 by Rogelio Prater RN  Outcome: Progressing     Problem: Respiratory - Adult  Goal: Achieves optimal ventilation and oxygenation  1/14/2023 0022 by Gala Walters RN  Outcome: Progressing  Note: Assess Prescription Ordered    Current Diet Order: Cardiac    Evaluation of Received Nutrient/Fluid Intake    Comments: LBM 5/16  Tolerance: tolerating  % Intake of Estimated Energy Needs: 75 - 100 %  % Meal Intake: 75 - 100 %    Nutrition Risk    Level of Risk/Frequency of Follow-up: (f/u 1 x wk)     Assessment and Plan    No nutrition diagnosis at this time.     Monitor and Evaluation    Food and Nutrient Intake: energy intake, food and beverage intake  Food and Nutrient Adminstration: diet order  Physical Activity and Function: nutrition-related ADLs and IADLs  Anthropometric Measurements: weight, weight change, body mass index  Biochemical Data, Medical Tests and Procedures: gastrointestinal profile, electrolyte and renal panel, glucose/endocrine profile, inflammatory profile, lipid profile  Nutrition-Focused Physical Findings: overall appearance     Nutrition Follow-Up    RD Follow-up?: Yes     breath sounds every shift and as needed. Assess oxygenation level & respiration rate. Encourage coughing & deep breathing. Encourage use of incentive spirometer. Assess cough & sputum. Administer oxygen as needed. 1/13/2023 1828 by Christa Merino RN  Outcome: Progressing     Problem: Pain  Goal: Verbalizes/displays adequate comfort level or baseline comfort level  1/14/2023 0022 by Lu Sandoval RN  Outcome: Progressing  Note: Monitoring pain with each assessment and prn. JANIS 0-10 pain scale utilized. Non-pharmacological measures to be encouraged prior to pharmacological measures.     1/13/2023 1828 by Christa Merino RN  Outcome: Progressing

## 2023-10-18 NOTE — SUBJECTIVE & OBJECTIVE
Past Medical History:   Diagnosis Date    Hypothyroid     Subdural hematoma 5/17/2019     No past surgical history on file.   Current Facility-Administered Medications on File Prior to Encounter   Medication Dose Route Frequency Provider Last Rate Last Dose    [DISCONTINUED] 0.9%  NaCl infusion   Intravenous Continuous Nino Bowman MD 75 mL/hr at 05/18/19 0737      [DISCONTINUED] chlorhexidine 0.12 % solution 10 mL  10 mL Mouth/Throat BID Merline Hanley MD   10 mL at 05/18/19 0853    [DISCONTINUED] levETIRAcetam in NaCl (iso-os) IVPB 500 mg  500 mg Intravenous Q12H Merline Hanley  mL/hr at 05/18/19 0853 500 mg at 05/18/19 0853    [DISCONTINUED] morphine injection 2 mg  2 mg Intravenous Q4H PRN Merline Hanley MD        [DISCONTINUED] mupirocin 2 % ointment   Nasal BID Merline Hanley MD        [DISCONTINUED] ondansetron injection 4 mg  4 mg Intravenous Q8H PRN Merline Hanley MD        [DISCONTINUED] sodium chloride 0.9% flush 10 mL  10 mL Intravenous PRN Merline Hanley MD         Current Outpatient Medications on File Prior to Encounter   Medication Sig Dispense Refill    cholecalciferol, vitamin D3, (VITAMIN D3) 400 unit Chew Take by mouth.      folic acid (FOLVITE) 1 MG tablet Take 1 mg by mouth once daily.      levothyroxine (SYNTHROID) 100 MCG tablet Take 100 mcg by mouth once daily.        Allergies: Patient has no known allergies.    No family history on file.  Social History     Tobacco Use    Smoking status: Never Smoker    Smokeless tobacco: Never Used   Substance Use Topics    Alcohol use: Not on file    Drug use: Not on file     Review of Systems   Constitutional: Negative.    HENT: Negative.    Eyes: Negative.    Respiratory: Negative.    Cardiovascular: Negative.    Gastrointestinal: Negative.    Endocrine: Negative.    Genitourinary: Negative.    Musculoskeletal: Negative.    Skin: Negative.    Allergic/Immunologic: Negative.     Neurological: Positive for weakness, light-headedness and numbness.   Hematological: Negative.    Psychiatric/Behavioral: Negative.      Objective:     Vitals:    Temp: 98.5 °F (36.9 °C)  Pulse: 81  Rhythm: normal sinus rhythm  BP: 101/73  MAP (mmHg): 84  Resp: 18  SpO2: 98 %    Temp  Min: 97.7 °F (36.5 °C)  Max: 98.5 °F (36.9 °C)  Pulse  Min: 56  Max: 90  BP  Min: 95/59  Max: 120/78  MAP (mmHg)  Min: 68  Max: 94  Resp  Min: 12  Max: 20  SpO2  Min: 98 %  Max: 99 %    No intake/output data recorded.   Unmeasured Output  Urine Occurrence: 1       Physical Exam   Constitutional: She is oriented to person, place, and time. She appears well-developed and well-nourished.   HENT:   Head: Normocephalic and atraumatic.   Eyes: Pupils are equal, round, and reactive to light. EOM are normal.   Neck: Normal range of motion.   Cardiovascular: Normal rate and regular rhythm.   Pulmonary/Chest: Effort normal and breath sounds normal.   Abdominal: Soft. She exhibits no distension. There is no tenderness.   Musculoskeletal: Normal range of motion.   Neurological: She is alert and oriented to person, place, and time. She has normal strength and normal reflexes. She displays normal reflexes. No cranial nerve deficit or sensory deficit. She exhibits normal muscle tone. She displays a negative Romberg sign. GCS eye subscore is 4. GCS verbal subscore is 5. GCS motor subscore is 6.   Skin: Skin is warm and dry.   Psychiatric: She has a normal mood and affect. Her behavior is normal. Judgment and thought content normal.   Nursing note and vitals reviewed.      Today I personally reviewed pertinent medications, lines/drains/airways, imaging, cardiology results, laboratory results, notably:       2